# Patient Record
Sex: MALE | Race: WHITE | Employment: UNEMPLOYED | URBAN - METROPOLITAN AREA
[De-identification: names, ages, dates, MRNs, and addresses within clinical notes are randomized per-mention and may not be internally consistent; named-entity substitution may affect disease eponyms.]

---

## 2018-05-17 ENCOUNTER — HOSPITAL ENCOUNTER (OUTPATIENT)
Dept: LAB | Age: 53
Discharge: HOME | End: 2018-05-17

## 2018-05-17 PROCEDURE — 99001 SPECIMEN HANDLING PT-LAB: CPT | Performed by: INTERNAL MEDICINE

## 2019-02-06 ENCOUNTER — HOSPITAL ENCOUNTER (OUTPATIENT)
Dept: LAB | Age: 54
Discharge: HOME | End: 2019-02-06

## 2019-02-06 PROCEDURE — 99001 SPECIMEN HANDLING PT-LAB: CPT

## 2019-08-21 ENCOUNTER — OFFICE VISIT (OUTPATIENT)
Dept: CARDIOLOGY CLINIC | Age: 54
End: 2019-08-21

## 2019-08-21 VITALS
HEART RATE: 75 BPM | HEIGHT: 69 IN | BODY MASS INDEX: 16 KG/M2 | DIASTOLIC BLOOD PRESSURE: 94 MMHG | SYSTOLIC BLOOD PRESSURE: 157 MMHG | WEIGHT: 108 LBS

## 2019-08-21 DIAGNOSIS — R01.1 HEART MURMUR: ICD-10-CM

## 2019-08-21 DIAGNOSIS — J44.9 CHRONIC OBSTRUCTIVE PULMONARY DISEASE, UNSPECIFIED COPD TYPE (HCC): ICD-10-CM

## 2019-08-21 DIAGNOSIS — E78.5 HYPERLIPIDEMIA, UNSPECIFIED HYPERLIPIDEMIA TYPE: ICD-10-CM

## 2019-08-21 DIAGNOSIS — Z86.73 OLD CEREBROVASCULAR ACCIDENT (CVA) WITHOUT LATE EFFECT: ICD-10-CM

## 2019-08-21 DIAGNOSIS — R07.9 CHEST PAIN, UNSPECIFIED TYPE: Primary | ICD-10-CM

## 2019-08-21 DIAGNOSIS — I10 ESSENTIAL HYPERTENSION: ICD-10-CM

## 2019-08-21 RX ORDER — OMEPRAZOLE 20 MG/1
20 CAPSULE, DELAYED RELEASE ORAL DAILY
COMMUNITY
End: 2019-08-21

## 2019-08-21 RX ORDER — NITROGLYCERIN 400 UG/1
1 SPRAY ORAL
Qty: 1 BOTTLE | Refills: 1 | Status: SHIPPED | OUTPATIENT
Start: 2019-08-21 | End: 2021-07-22

## 2019-08-21 NOTE — PROGRESS NOTES
HISTORY OF PRESENT ILLNESS  Edwin Hartman is a 48 y.o. male. New Patient   The history is provided by the patient. Associated symptoms include shortness of breath. Pertinent negatives include no chest pain, no abdominal pain and no headaches. Chest Pain (Angina)    The history is provided by the patient. This is a new problem. The current episode started more than 1 week ago. The problem has not changed since onset. The problem occurs every several days. The pain is associated with rest. The pain is present in the left side and substernal region. The pain is mild. The quality of the pain is described as sharp. The pain does not radiate. Associated symptoms include shortness of breath. Pertinent negatives include no abdominal pain, no claudication, no cough, no dizziness, no fever, no headaches, no hemoptysis, no nausea, no orthopnea, no palpitations, no PND, no sputum production, no vomiting and no weakness. Shortness of Breath   The history is provided by the patient. This is a recurrent problem. The problem occurs frequently. The current episode started more than 1 week ago. The problem has not changed since onset. Pertinent negatives include no fever, no headaches, no cough, no sputum production, no hemoptysis, no wheezing, no PND, no orthopnea, no chest pain, no vomiting, no abdominal pain, no rash, no leg swelling and no claudication. Precipitated by: exertion. Review of Systems   Constitutional: Negative for chills and fever. HENT: Negative for nosebleeds. Eyes: Negative for blurred vision and double vision. Respiratory: Positive for shortness of breath. Negative for cough, hemoptysis, sputum production and wheezing. Cardiovascular: Negative for chest pain, palpitations, orthopnea, claudication, leg swelling and PND. Gastrointestinal: Negative for abdominal pain, heartburn, nausea and vomiting. Musculoskeletal: Negative for myalgias. Skin: Negative for rash.    Neurological: Negative for dizziness, weakness and headaches. Endo/Heme/Allergies: Does not bruise/bleed easily. Family History   Problem Relation Age of Onset    Heart Disease Mother         Ischemic  heart disease    Heart Attack Father        Past Medical History:   Diagnosis Date    CAD (coronary artery disease)     Chest pain, unspecified     Chronic airway obstruction, not elsewhere classified     Chronic lung disease     COPD     Coronary atherosclerosis of native coronary artery     Noncritical left main disease    Essential hypertension, benign     Fatigue     Hepatitis B     History of hepatitis B    Hypercholesteremia     Hypertension     Intermediate coronary syndrome ([facility] [address]     Other and unspecified hyperlipidemia     Pneumothorax 2005    Psychiatric disorder     depression    Rectal bleeding     [facility]    TIA - weakness - generalized    Weight loss        Past Surgical History:   Procedure Laterality Date    HX OTHER SURGICAL      Lung collapse       Social History     Tobacco Use    Smoking status: Current Every Day Smoker     Packs/day: 0.50     Years: 30.00     Pack years: 15.00     Types: Cigarettes    Smokeless tobacco: Never Used   Substance Use Topics    Alcohol use: Not Currently       No Known Allergies    Prior to Admission medications    Medication Sig Start Date End Date Taking? Authorizing Provider   tiotropium-olodaterol (Marcina Myron) 2.5-2.5 mcg/actuation inhaler Take  by inhalation. Yes Provider, Historical   ipratropium-albuterol (COMBIVENT RESPIMAT)  mcg/actuation inhaler Take 1 Puff by inhalation every six (6) hours. Yes Provider, Historical   nitroglycerin (NITROLINGUAL) 400 mcg/spray spray 1 Spray by SubLINGual route every five (5) minutes as needed for Chest Pain. 8/21/19  Yes Tayo Hogan MD   metoprolol (LOPRESSOR) 25 mg tablet Take 25 mg by mouth daily.    Yes Provider, Historical   simvastatin (ZOCOR) 20 mg tablet Take 20 mg by mouth nightly. Yes Provider, Historical   aspirin 81 mg tablet Take 81 mg by mouth daily. Yes Provider, Historical   hydrocodone-acetaminophen (NORCO) 5-325 mg per tablet Take 1-2 tablets PO every 4-6 hours as needed for pain control. If over the counter ibuprofen or acetaminophen was suggested, then only take the vicodin for pain not well controlled with the over the counter medication. 9/4/14   Ravindra Sandoval, DO         Visit Vitals  BP (!) 157/94   Pulse 75   Ht 5' 9\" (1.753 m)   Wt 49 kg (108 lb)   BMI 15.95 kg/m²         Physical Exam   Constitutional: He is oriented to person, place, and time. He appears well-developed and well-nourished. HENT:   Head: Normocephalic and atraumatic. Eyes: Conjunctivae are normal.   Neck: Neck supple. No JVD present. No tracheal deviation present. No thyromegaly present. Cardiovascular: Normal rate and regular rhythm. Exam reveals no gallop and no friction rub. Murmur heard. Holosystolic murmur is present with a grade of 3/6 at the lower right sternal border and apex. Pulmonary/Chest: Breath sounds normal. No respiratory distress. He has no wheezes. He has no rales. He exhibits no tenderness. Abdominal: Soft. There is no tenderness. Musculoskeletal: He exhibits no edema. Neurological: He is alert and oriented to person, place, and time. Skin: Skin is warm and dry. Psychiatric: He has a normal mood and affect. Mr. Talha Butler has a reminder for a \"due or due soon\" health maintenance. I have asked that he contact his primary care provider for follow-up on this health maintenance. No flowsheet data found. Assessment         ICD-10-CM ICD-9-CM    1. Chest pain, unspecified type R07.9 786.50 AMB POC EKG ROUTINE W/ 12 LEADS, INTER & REP      ECHO ADULT COMPLETE      NUCLEAR CARDIAC STRESS TEST    Atypical chest pain. Possible angina or chest wall. Abnormal EKG poor artery progression possible old anteroseptal MI.   2. Essential hypertension I10 401.9     Mildly elevated monitor adjust medication as needed   3. Hyperlipidemia, unspecified hyperlipidemia type E78.5 272.4     Continue with statin   4. Chronic obstructive pulmonary disease, unspecified COPD type ([facility] J44.9 496 ECHO ADULT COMPLETE      NUCLEAR CARDIAC STRESS TEST    Continue treatment follow-up with PCP   5. Old cerebrovascular accident (CVA) without late effect Z86.73 V12.54     Monitoring aspirin and statin   6. Heart murmur R01.1 785. 2     Systolic murmur possible mitral regurgitation. Check echo       Medications Discontinued During This Encounter   Medication Reason    omeprazole (PRILOSEC) 20 mg capsule Not A Current Medication    PARoxetine (PAXIL) 20 mg tablet Not A Current Medication    beclomethasone dipropionate 84 mcg/actuation Aero Not A Current Medication    nitroglycerin (NITROLINGUAL) 0.4 mg/dose spray Reorder       Orders Placed This Encounter    AMB POC EKG ROUTINE W/ 12 LEADS, INTER & REP     Order Specific Question:   Reason for Exam:     Answer:   Chest Pain    nitroglycerin (NITROLINGUAL) 400 mcg/spray spray     Si Spray by SubLINGual route every five (5) minutes as needed for Chest Pain. Dispense:  1 Bottle     Refill:  1       Follow-up and Dispositions    · Return for F/u after tests.

## 2019-08-28 ENCOUNTER — OFFICE VISIT (OUTPATIENT)
Dept: CARDIOLOGY CLINIC | Age: 54
End: 2019-08-28

## 2019-08-28 VITALS
HEIGHT: 69 IN | BODY MASS INDEX: 15.91 KG/M2 | SYSTOLIC BLOOD PRESSURE: 144 MMHG | WEIGHT: 107.4 LBS | DIASTOLIC BLOOD PRESSURE: 97 MMHG | HEART RATE: 86 BPM

## 2019-08-28 DIAGNOSIS — I34.0 NON-RHEUMATIC MITRAL REGURGITATION: ICD-10-CM

## 2019-08-28 DIAGNOSIS — I34.1 MVP (MITRAL VALVE PROLAPSE): ICD-10-CM

## 2019-08-28 DIAGNOSIS — J44.9 CHRONIC OBSTRUCTIVE PULMONARY DISEASE, UNSPECIFIED COPD TYPE (HCC): ICD-10-CM

## 2019-08-28 DIAGNOSIS — I10 ESSENTIAL HYPERTENSION: ICD-10-CM

## 2019-08-28 DIAGNOSIS — R07.9 CHEST PAIN, UNSPECIFIED TYPE: Primary | ICD-10-CM

## 2019-08-28 RX ORDER — AMLODIPINE BESYLATE 5 MG/1
5 TABLET ORAL DAILY
Qty: 30 TAB | Refills: 6 | Status: SHIPPED | OUTPATIENT
Start: 2019-08-28 | End: 2020-08-28

## 2019-08-28 NOTE — PROGRESS NOTES
HISTORY OF PRESENT ILLNESS  Lobo Adamson is a 48 y.o. male. Chest Pain (Angina)    The history is provided by the patient. This is a new problem. The current episode started more than 1 week ago. The problem has not changed since onset. The problem occurs every several days. The pain is associated with rest. The pain is present in the left side and substernal region. The pain is mild. The quality of the pain is described as sharp. The pain does not radiate. Associated symptoms include shortness of breath. Pertinent negatives include no claudication, no cough, no dizziness, no fever, no hemoptysis, no nausea, no orthopnea, no palpitations, no PND, no sputum production, no vomiting and no weakness. Shortness of Breath   The history is provided by the patient. This is a recurrent problem. The problem occurs frequently. The current episode started more than 1 week ago. The problem has not changed since onset. Associated symptoms include chest pain. Pertinent negatives include no fever, no cough, no sputum production, no hemoptysis, no wheezing, no PND, no orthopnea, no vomiting, no rash, no leg swelling and no claudication. Precipitated by: exertion. Review of Systems   Constitutional: Negative for chills and fever. HENT: Negative for nosebleeds. Eyes: Negative for blurred vision and double vision. Respiratory: Positive for shortness of breath. Negative for cough, hemoptysis, sputum production and wheezing. Cardiovascular: Positive for chest pain. Negative for palpitations, orthopnea, claudication, leg swelling and PND. Gastrointestinal: Negative for heartburn, nausea and vomiting. Musculoskeletal: Negative for myalgias. Skin: Negative for rash. Neurological: Negative for dizziness and weakness. Endo/Heme/Allergies: Does not bruise/bleed easily.      Family History   Problem Relation Age of Onset    Heart Attack Father     Heart Disease Mother         Ischemic  heart disease Past Medical History:   Diagnosis Date    CAD (coronary artery disease)     Chest pain, unspecified     Chronic airway obstruction, not elsewhere classified     Chronic lung disease     COPD     Coronary atherosclerosis of native coronary artery     Noncritical left main disease    Essential hypertension, benign     Fatigue     Hepatitis B     History of hepatitis B    Hypercholesteremia     Hypertension     Intermediate coronary syndrome (HCC)     Other and unspecified hyperlipidemia     Pneumothorax 2005    Psychiatric disorder     depression    Rectal bleeding     Stroke ([facility] [address] 2005    TIA - weakness - generalized    Weight loss        Past Surgical History:   Procedure Laterality Date    HX OTHER SURGICAL      Lung collapse       Social History     Tobacco Use    Smoking status: Current Every Day Smoker     Packs/day: 0.50     Years: 30.00     Pack years: 15.00     Types: Cigarettes    Smokeless tobacco: Never Used   Substance Use Topics    Alcohol use: Not Currently       No Known Allergies    Prior to Admission medications    Medication Sig Start Date End Date Taking? Authorizing Provider   amLODIPine (NORVASC) 5 mg tablet Take 1 Tab by mouth daily. 8/28/19  Yes oKrey Joyce MD   tiotropium-olodaterol (STIOLTO RESPIMAT) 2.5-2.5 mcg/actuation inhaler Take  by inhalation. Yes Provider, Historical   ipratropium-albuterol (COMBIVENT RESPIMAT)  mcg/actuation inhaler Take 1 Puff by inhalation every six (6) hours. Yes Provider, Historical   nitroglycerin (NITROLINGUAL) 400 mcg/spray spray 1 Spray by SubLINGual route every five (5) minutes as needed for Chest Pain. 8/21/19  Yes Korey Joyce MD   hydrocodone-acetaminophen [facility]) 5-325 mg per tablet Take 1-2 tablets PO every 4-6 hours as needed for pain control. If over the counter ibuprofen or acetaminophen was suggested, then only take the vicodin for pain not well controlled with the over the counter medication.  9/4/14 Yes Hollie Sandoval, DO   metoprolol (LOPRESSOR) 25 mg tablet Take 25 mg by mouth daily. Yes Provider, Historical   simvastatin (ZOCOR) 20 mg tablet Take 20 mg by mouth nightly. Yes Provider, Historical   aspirin 81 mg tablet Take 81 mg by mouth daily. Yes Provider, Historical         Visit Vitals  BP (!) 144/97   Pulse 86   Ht 5' 9\" (1.753 m)   Wt 48.7 kg (107 lb 6.4 oz)   BMI 15.86 kg/m²         Physical Exam   Constitutional: He is oriented to person, place, and time. He appears well-developed and well-nourished. HENT:   Head: Normocephalic and atraumatic. Eyes: Conjunctivae are normal.   Neck: Neck supple. No JVD present. No tracheal deviation present. No thyromegaly present. Cardiovascular: Normal rate and regular rhythm. Exam reveals no gallop and no friction rub. Murmur heard. Holosystolic murmur is present with a grade of 3/6 at the lower right sternal border and apex. Pulmonary/Chest: Breath sounds normal. No respiratory distress. He has no wheezes. He has no rales. He exhibits no tenderness. Abdominal: Soft. There is no tenderness. Musculoskeletal: He exhibits no edema. Neurological: He is alert and oriented to person, place, and time. Skin: Skin is warm and dry. Psychiatric: He has a normal mood and affect. Mr. Enmanuel Altamirano has a reminder for a \"due or due soon\" health maintenance. I have asked that he contact his primary care provider for follow-up on this health maintenance. No flowsheet data found. Interpretation Summary 8/2019    · Left Ventricle: Normal cavity size, wall thickness and systolic function (ejection fraction normal). Estimated left ventricular ejection fraction is 56 - 60%. No regional wall motion abnormality noted. Moderate (grade 2) left ventricular diastolic dysfunction. · Right Ventricle: Normal right ventricular size and function. · Mitral Valve: Mitral valve prolapse of the anterior leaflet. Moderate mitral valve regurgitation.   · Tricuspid Valve: Mild tricuspid valve regurgitation is present. · Pulmonary Artery: There is no evidence of pulmonary hypertension. · Pulmonic Valve: Mild pulmonic valve regurgitation is present. · Aorta: Mild aortic root dilatation. Procedure Conclusion 8/2019    Nuclear Stress Test     Negative myocardial perfusion imaging. Myocardial perfusion imaging supports a low risk stress test.   There is a prior study available for comparison. Assessment         ICD-10-CM ICD-9-CM    1. Chest pain, unspecified type R07.9 786.50     Atypical chest pain. Negative stress test continue monitoring   2. Chronic obstructive pulmonary disease, unspecified COPD type ([facility] J44.9 496     Stable on treatment   3. MVP (mitral valve prolapse) I34.1 424.0     Prolapse continue monitoring   4. Non-rheumatic mitral regurgitation I34.0 424.0     Moderate mitral regurgitation related to prolapse. Monitor. 5. Essential hypertension I10 401.9     Mildly elevated. .  Add amlodipine   8/2019   mitral valve prolapse with moderate mitral regurgitation. Mildly elevated blood pressure. Add amlodipine continue to monitor. There are no discontinued medications. Orders Placed This Encounter    amLODIPine (NORVASC) 5 mg tablet     Sig: Take 1 Tab by mouth daily. Dispense:  30 Tab     Refill:  6       Follow-up and Dispositions    · Return in about 6 months (around 2/28/2020).

## 2019-08-28 NOTE — PROGRESS NOTES
1. Have you been to the ER, urgent care clinic since your last visit? Hospitalized since your last visit? No    2. Have you seen or consulted any other health care providers outside of the [address] [place] [address] since your last visit? Include any pap smears or colon screening.  No

## 2019-10-23 ENCOUNTER — HOSPITAL ENCOUNTER (OUTPATIENT)
Dept: LAB | Age: 54
Discharge: HOME | End: 2019-10-23
Payer: MEDICAID

## 2019-10-23 LAB
ALBUMIN SERPL-MCNC: 3.9 G/DL (ref 3.4–5)
ALBUMIN/GLOB SERPL: 1.4 {RATIO} (ref 0.8–1.7)
ALP SERPL-CCNC: 78 U/L (ref 45–117)
ALT SERPL-CCNC: 16 U/L (ref 16–61)
ANION GAP SERPL CALC-SCNC: 5 MMOL/L (ref 3–18)
AST SERPL-CCNC: 12 U/L (ref 10–38)
BASOPHILS # BLD: 0 K/UL (ref 0–0.1)
BASOPHILS NFR BLD: 1 % (ref 0–2)
BILIRUB SERPL-MCNC: 0.4 MG/DL (ref 0.2–1)
BUN SERPL-MCNC: 11 MG/DL (ref 7–18)
BUN/CREAT SERPL: 12 (ref 12–20)
CALCIUM SERPL-MCNC: 8.8 MG/DL (ref 8.5–10.1)
CHLORIDE SERPL-SCNC: 107 MMOL/L (ref 100–111)
CHOLEST SERPL-MCNC: 171 MG/DL
CO2 SERPL-SCNC: 30 MMOL/L (ref 21–32)
CREAT SERPL-MCNC: 0.9 MG/DL (ref 0.6–1.3)
DIFFERENTIAL METHOD BLD: ABNORMAL
EOSINOPHIL # BLD: 0 K/UL (ref 0–0.4)
EOSINOPHIL NFR BLD: 1 % (ref 0–5)
ERYTHROCYTE [DISTWIDTH] IN BLOOD BY AUTOMATED COUNT: 13.3 % (ref 11.6–14.5)
GLOBULIN SER CALC-MCNC: 2.7 G/DL (ref 2–4)
GLUCOSE SERPL-MCNC: 80 MG/DL (ref 74–99)
HCT VFR BLD AUTO: 42.4 % (ref 36–48)
HDLC SERPL-MCNC: 56 MG/DL (ref 40–60)
HDLC SERPL: 3.1 {RATIO} (ref 0–5)
HGB BLD-MCNC: 14 G/DL (ref 13–16)
LDLC SERPL CALC-MCNC: 96.8 MG/DL (ref 0–100)
LIPID PROFILE,FLP: NORMAL
LYMPHOCYTES # BLD: 2.1 K/UL (ref 0.9–3.6)
LYMPHOCYTES NFR BLD: 30 % (ref 21–52)
MCH RBC QN AUTO: 30.8 PG (ref 24–34)
MCHC RBC AUTO-ENTMCNC: 33 G/DL (ref 31–37)
MCV RBC AUTO: 93.4 FL (ref 74–97)
MONOCYTES # BLD: 0.5 K/UL (ref 0.05–1.2)
MONOCYTES NFR BLD: 7 % (ref 3–10)
NEUTS SEG # BLD: 4.2 K/UL (ref 1.8–8)
NEUTS SEG NFR BLD: 61 % (ref 40–73)
PLATELET # BLD AUTO: 210 K/UL (ref 135–420)
PMV BLD AUTO: 10.6 FL (ref 9.2–11.8)
POTASSIUM SERPL-SCNC: 4.2 MMOL/L (ref 3.5–5.5)
PROT SERPL-MCNC: 6.6 G/DL (ref 6.4–8.2)
RBC # BLD AUTO: 4.54 M/UL (ref 4.7–5.5)
SODIUM SERPL-SCNC: 142 MMOL/L (ref 136–145)
TRIGL SERPL-MCNC: 91 MG/DL (ref ?–150)
VLDLC SERPL CALC-MCNC: 18.2 MG/DL
WBC # BLD AUTO: 6.9 K/UL (ref 4.6–13.2)

## 2019-10-23 PROCEDURE — 84436 ASSAY OF TOTAL THYROXINE: CPT

## 2019-10-23 PROCEDURE — 85025 COMPLETE CBC W/AUTO DIFF WBC: CPT

## 2019-10-23 PROCEDURE — 80053 COMPREHEN METABOLIC PANEL: CPT

## 2019-10-23 PROCEDURE — 80061 LIPID PANEL: CPT

## 2019-10-23 PROCEDURE — 36415 COLL VENOUS BLD VENIPUNCTURE: CPT

## 2019-10-24 LAB
T4 SERPL-MCNC: 8.1 UG/DL (ref 4.5–12.1)
TSH SERPL DL<=0.05 MIU/L-ACNC: 1.77 UIU/ML (ref 0.36–3.74)

## 2020-02-19 ENCOUNTER — OFFICE VISIT (OUTPATIENT)
Dept: CARDIOLOGY CLINIC | Age: 55
End: 2020-02-19

## 2020-02-19 VITALS
TEMPERATURE: 97 F | WEIGHT: 107 LBS | SYSTOLIC BLOOD PRESSURE: 104 MMHG | DIASTOLIC BLOOD PRESSURE: 66 MMHG | HEART RATE: 79 BPM | BODY MASS INDEX: 15.85 KG/M2 | HEIGHT: 69 IN | OXYGEN SATURATION: 98 %

## 2020-02-19 DIAGNOSIS — I34.0 NON-RHEUMATIC MITRAL REGURGITATION: Primary | ICD-10-CM

## 2020-02-19 DIAGNOSIS — Z86.73 OLD CEREBROVASCULAR ACCIDENT (CVA) WITHOUT LATE EFFECT: ICD-10-CM

## 2020-02-19 DIAGNOSIS — I34.1 MVP (MITRAL VALVE PROLAPSE): ICD-10-CM

## 2020-02-19 DIAGNOSIS — J44.9 CHRONIC OBSTRUCTIVE PULMONARY DISEASE, UNSPECIFIED COPD TYPE (HCC): ICD-10-CM

## 2020-02-19 DIAGNOSIS — I10 ESSENTIAL HYPERTENSION: ICD-10-CM

## 2020-02-19 DIAGNOSIS — E78.5 HYPERLIPIDEMIA, UNSPECIFIED HYPERLIPIDEMIA TYPE: ICD-10-CM

## 2020-02-19 RX ORDER — CYCLOBENZAPRINE HCL 10 MG
10 TABLET ORAL 2 TIMES DAILY
COMMUNITY

## 2020-02-19 RX ORDER — IBUPROFEN 800 MG/1
800 TABLET ORAL
COMMUNITY

## 2020-02-19 RX ORDER — OMEPRAZOLE 20 MG/1
20 CAPSULE, DELAYED RELEASE ORAL DAILY
COMMUNITY
End: 2021-03-03

## 2020-02-19 NOTE — PROGRESS NOTES
1. Have you been to the ER, urgent care clinic since your last visit? Hospitalized since your last visit? No    2. Have you seen or consulted any other health care providers outside of the [address] [place] [address] since your last visit? Include any pap smears or colon screening.  No

## 2020-02-19 NOTE — PROGRESS NOTES
HISTORY OF PRESENT ILLNESS  Tere Hernandez is a 47 y.o. male. Chest Pain (Angina)    The history is provided by the patient. This is a new problem. The current episode started more than 1 week ago. The problem has not changed since onset. The problem occurs every several days. The pain is associated with rest. The pain is present in the left side and substernal region. The pain is mild. The quality of the pain is described as sharp. The pain does not radiate. Associated symptoms include shortness of breath. Pertinent negatives include no claudication, no cough, no dizziness, no fever, no hemoptysis, no nausea, no orthopnea, no palpitations, no PND, no sputum production, no vomiting and no weakness. Shortness of Breath   The history is provided by the patient. This is a recurrent problem. The problem occurs frequently. The current episode started more than 1 week ago. The problem has not changed since onset. Associated symptoms include chest pain. Pertinent negatives include no fever, no cough, no sputum production, no hemoptysis, no wheezing, no PND, no orthopnea, no vomiting, no rash, no leg swelling and no claudication. Precipitated by: exertion. Review of Systems   Constitutional: Negative for chills and fever. HENT: Negative for nosebleeds. Eyes: Negative for blurred vision and double vision. Respiratory: Positive for shortness of breath. Negative for cough, hemoptysis, sputum production and wheezing. Cardiovascular: Positive for chest pain. Negative for palpitations, orthopnea, claudication, leg swelling and PND. Gastrointestinal: Negative for heartburn, nausea and vomiting. Musculoskeletal: Negative for myalgias. Skin: Negative for rash. Neurological: Negative for dizziness and weakness. Endo/Heme/Allergies: Does not bruise/bleed easily.      Family History   Problem Relation Age of Onset    Heart Attack Father     Heart Disease Mother         Ischemic  heart disease Past Medical History:   Diagnosis Date    CAD (coronary artery disease)     Chest pain, unspecified     Chronic airway obstruction, not elsewhere classified     Chronic lung disease     COPD     Coronary atherosclerosis of native coronary artery     Noncritical left main disease    Essential hypertension, benign     Fatigue     Hepatitis B     History of hepatitis B    Hypercholesteremia     Hypertension     Intermediate coronary syndrome (HCC)     Other and unspecified hyperlipidemia     Pneumothorax 2005    Psychiatric disorder     depression    Rectal bleeding     Stroke ([facility] [address] 2005    TIA - weakness - generalized    Weight loss        Past Surgical History:   Procedure Laterality Date    HX OTHER SURGICAL      Lung collapse       Social History     Tobacco Use    Smoking status: Current Every Day Smoker     Packs/day: 0.50     Years: 30.00     Pack years: 15.00     Types: Cigarettes    Smokeless tobacco: Never Used   Substance Use Topics    Alcohol use: Not Currently       No Known Allergies    Prior to Admission medications    Medication Sig Start Date End Date Taking? Authorizing Provider   ibuprofen (MOTRIN) 800 mg tablet Take 800 mg by mouth every eight (8) hours as needed for Pain. Yes Provider, Historical   cyclobenzaprine (FLEXERIL) 10 mg tablet Take 10 mg by mouth two (2) times a day. Yes Provider, Historical   omeprazole (PRILOSEC) 20 mg capsule Take 20 mg by mouth daily. Yes Provider, Historical   amLODIPine (NORVASC) 5 mg tablet Take 1 Tab by mouth daily. 8/28/19  Yes Nasra Manning MD   tiotropium-olodaterol (STIOLTO RESPIMAT) 2.5-2.5 mcg/actuation inhaler Take  by inhalation. Yes Provider, Historical   ipratropium-albuterol (COMBIVENT RESPIMAT)  mcg/actuation inhaler Take 1 Puff by inhalation every six (6) hours.    Yes Provider, Historical   nitroglycerin (NITROLINGUAL) 400 mcg/spray spray 1 Spray by SubLINGual route every five (5) minutes as needed for Chest Pain. 8/21/19  Yes Aleksandar Quiles MD   metoprolol (LOPRESSOR) 25 mg tablet Take 25 mg by mouth daily. Yes Provider, Historical   simvastatin (ZOCOR) 20 mg tablet Take 20 mg by mouth nightly. Yes Provider, Historical   aspirin 81 mg tablet Take 81 mg by mouth daily. Yes Provider, Historical   hydrocodone-acetaminophen (NORCO) 5-325 mg per tablet Take 1-2 tablets PO every 4-6 hours as needed for pain control. If over the counter ibuprofen or acetaminophen was suggested, then only take the vicodin for pain not well controlled with the over the counter medication. 9/4/14   Abby Sandovla DO         Visit Vitals  /66 (BP 1 Location: Left arm, BP Patient Position: Sitting)   Pulse 79   Temp 97 °F (36.1 °C) (Oral)   Ht 5' 9\" (1.753 m)   Wt 48.5 kg (107 lb)   SpO2 98%   BMI 15.80 kg/m²         Physical Exam   Constitutional: He is oriented to person, place, and time. He appears well-developed and well-nourished. HENT:   Head: Normocephalic and atraumatic. Eyes: Conjunctivae are normal.   Neck: Neck supple. No JVD present. No tracheal deviation present. No thyromegaly present. Cardiovascular: Normal rate and regular rhythm. Exam reveals no gallop and no friction rub. Murmur heard. Holosystolic murmur is present with a grade of 3/6 at the lower right sternal border and apex. Pulmonary/Chest: Breath sounds normal. No respiratory distress. He has no wheezes. He has no rales. He exhibits no tenderness. Abdominal: Soft. There is no abdominal tenderness. Musculoskeletal:         General: No edema. Neurological: He is alert and oriented to person, place, and time. Skin: Skin is warm and dry. Psychiatric: He has a normal mood and affect. Mr. Elan Bryant has a reminder for a \"due or due soon\" health maintenance. I have asked that he contact his primary care provider for follow-up on this health maintenance. No flowsheet data found.      Interpretation Summary 8/2019    · Left Ventricle: Normal cavity size, wall thickness and systolic function (ejection fraction normal). Estimated left ventricular ejection fraction is 56 - 60%. No regional wall motion abnormality noted. Moderate (grade 2) left ventricular diastolic dysfunction. · Right Ventricle: Normal right ventricular size and function. · Mitral Valve: Mitral valve prolapse of the anterior leaflet. Moderate mitral valve regurgitation. · Tricuspid Valve: Mild tricuspid valve regurgitation is present. · Pulmonary Artery: There is no evidence of pulmonary hypertension. · Pulmonic Valve: Mild pulmonic valve regurgitation is present. · Aorta: Mild aortic root dilatation. Procedure Conclusion 8/2019    Nuclear Stress Test     Negative myocardial perfusion imaging. Myocardial perfusion imaging supports a low risk stress test.   There is a prior study available for comparison. Assessment         ICD-10-CM ICD-9-CM    1. Non-rheumatic mitral regurgitation I34.0 424.0     Stable monitor. Short of breath multifactorial mostly from COPD   2. Chronic obstructive pulmonary disease, unspecified COPD type ([facility] J44.9 496 REFERRAL TO PULMONARY DISEASE    Stable continue treatment. Follow-up with PCP   3. MVP (mitral valve prolapse) I34.1 424.0     Stable monitor   4. Essential hypertension I10 401.9     Stable continue treatment   5. Hyperlipidemia, unspecified hyperlipidemia type E78.5 272.4     Stable monitor   6. Old cerebrovascular accident (CVA) without late effect Z86.73 V12.54     Stable continue treatment   8/2019   mitral valve prolapse with moderate mitral regurgitation. Mildly elevated blood pressure. Add amlodipine continue to monitor. There are no discontinued medications.     Orders Placed This Encounter    REFERRAL TO PULMONARY DISEASE     Referral Priority:   Routine     Referral Type:   Consultation     Referral Reason:   Specialty Services Required     Referred to Provider:   Bronwyn Gottron, MD Follow-up and Dispositions    · Return in about 6 months (around 8/19/2020).

## 2020-04-14 ENCOUNTER — OFFICE VISIT (OUTPATIENT)
Dept: PULMONOLOGY | Age: 55
End: 2020-04-14

## 2020-04-14 VITALS
OXYGEN SATURATION: 97 % | HEART RATE: 93 BPM | BODY MASS INDEX: 16 KG/M2 | DIASTOLIC BLOOD PRESSURE: 86 MMHG | HEIGHT: 69 IN | WEIGHT: 108 LBS | TEMPERATURE: 97.7 F | SYSTOLIC BLOOD PRESSURE: 141 MMHG | RESPIRATION RATE: 18 BRPM

## 2020-04-14 DIAGNOSIS — I34.0 NONRHEUMATIC MITRAL VALVE REGURGITATION: ICD-10-CM

## 2020-04-14 DIAGNOSIS — R06.02 SOB (SHORTNESS OF BREATH) ON EXERTION: Primary | ICD-10-CM

## 2020-04-14 DIAGNOSIS — R05.3 CHRONIC COUGH: ICD-10-CM

## 2020-04-14 DIAGNOSIS — R63.4 WEIGHT LOSS: ICD-10-CM

## 2020-04-14 RX ORDER — FLUTICASONE FUROATE, UMECLIDINIUM BROMIDE AND VILANTEROL TRIFENATATE 100; 62.5; 25 UG/1; UG/1; UG/1
1 POWDER RESPIRATORY (INHALATION) DAILY
Qty: 2 INHALER | Refills: 0 | Status: SHIPPED | COMMUNITY
Start: 2020-04-14 | End: 2020-09-02 | Stop reason: SDUPTHER

## 2020-04-14 NOTE — PATIENT INSTRUCTIONS
COPD and Asthma: Care Instructions Your Care Instructions Some people who have chronic obstructive pulmonary disease (COPD) also have asthma. Both of these problems can damage your lungs. This makes it very important to control them. Asthma causes the airways that lead to the lungs to swell and become narrow. This makes it hard to breathe. You may wheeze or cough. If you have a bad attack, you may need emergency care. There are two parts to treating asthma. · Controlling asthma over the long term. · Treating attacks when they occur. You and your doctor can make an asthma treatment plan that will help. This plan tells you the medicines you take every day to reduce the swelling in your airways and prevent attacks. It also tells you what to do if you have an asthma attack. Follow-up care is a key part of your treatment and safety. Be sure to make and go to all appointments, and call your doctor if you are having problems. It's also a good idea to know your test results and keep a list of the medicines you take. How can you care for yourself at home? To control asthma over the long term Medicines Controller medicines reduce swelling in your lungs. They also prevent asthma attacks. Take your controller medicine exactly as prescribed. Talk to your doctor if you have any problems with your medicine. · Inhaled corticosteroid is a common and effective controller medicine. Using it the right way can prevent or reduce most side effects. · Take your controller medicine every day, not just when you have symptoms. This helps prevent problems before they occur. · Always bring your asthma medicine with you when you travel. · Your doctor may prescribe long-acting medicine that combines a corticosteroid with a beta2-agonist. Follow your doctor's instructions exactly about how to take a long-acting medicine. Examples include: ? Fluticasone and salmeterol (Advair). ? Budesonide and formoterol (Symbicort). · Do not depend on your controller medicines to stop an asthma attack that has already started. They do not work fast enough to help. · Your doctor may also prescribe anticholinergic inhalers. These include ipratropium (Atrovent) and tiotropium (Spiriva). Education · Learn what sets off an asthma attack. Avoid these triggers when you can. Common triggers include smoke, air pollution, pollen, animal dander, colds, stress, and cold air. · Do not smoke. Smoking can make COPD and asthma worse. If you need help quitting, talk to your doctor about stop-smoking programs and medicines. These can increase your chances of quitting for good. · Check yourself for symptoms to know which step to follow in your action plan. Watch for things like being short of breath, having chest tightness, coughing, and wheezing. Also notice if symptoms wake you up at night or if you get tired quickly when you exercise. · You may want to learn how to use a peak flow meter. This measures how open your airways are. It may help you know when you will have an asthma attack. To treat attacks when they occur Use your asthma action plan when you have an attack. Your quick-relief medicine, such as albuterol, will stop an asthma attack. It relaxes the muscles that get tight around the airways. · Take your quick-relief medicine exactly as prescribed. Talk with your doctor if you have any problems with your medicine. · Keep this medicine with you at all times. · You may need to use this medicine before you exercise. If your doctor prescribed corticosteroid pills to use during an attack, take them as directed. They may take hours to work, but they may shorten the attack and help you breathe better. When should you call for help? Call 911 anytime you think you may need emergency care. For example, call if: 
  · You have severe trouble breathing.  
 Call your doctor now or seek immediate medical care if:   · You have new or worse shortness of breath.  
  · You are coughing more deeply or more often, especially if you notice more mucus or a change in the color of your mucus.  
  · You cough up blood.  
  · You have new or increased swelling in your legs or belly.  
  · You have a fever.  
  · You have used your quick-relief medicine but you are still short of breath.  
 Watch closely for changes in your health, and be sure to contact your doctor if you have any problems. Where can you learn more? Go to http://elizabeth-emiliana.info/ Enter A350 in the search box to learn more about \"COPD and Asthma: Care Instructions. \" Current as of: June 9, 2019Content Version: 12.4 © 3574-8580 Healthwise, Incorporated. Care instructions adapted under license by [employer] (which disclaims liability or warranty for this information). If you have questions about a medical condition or this instruction, always ask your healthcare professional. [place] [address] any warranty or liability for your use of this information. Stopping Smoking: Care Instructions Your Care Instructions Cigarette smokers crave the nicotine in cigarettes. Giving it up is much harder than simply changing a habit. Your body has to stop craving the nicotine. It is hard to quit, but you can do it. There are many tools that people use to quit smoking. You may find that combining tools works best for you. There are several steps to quitting. First you get ready to quit. Then you get support to help you. After that, you learn new skills and behaviors to become a nonsmoker. For many people, a necessary step is getting and using medicine. Your doctor will help you set up the plan that best meets your needs. You may want to attend a smoking cessation program to help you quit smoking. When you choose a program, look for one that has proven success.  Ask your doctor for ideas. You will greatly increase your chances of success if you take medicine as well as get counseling or join a cessation program. 
Some of the changes you feel when you first quit tobacco are uncomfortable. Your body will miss the nicotine at first, and you may feel short-tempered and grumpy. You may have trouble sleeping or concentrating. Medicine can help you deal with these symptoms. You may struggle with changing your smoking habits and rituals. The last step is the tricky one: Be prepared for the smoking urge to continue for a time. This is a lot to deal with, but keep at it. You will feel better. Follow-up care is a key part of your treatment and safety. Be sure to make and go to all appointments, and call your doctor if you are having problems. It's also a good idea to know your test results and keep a list of the medicines you take. How can you care for yourself at home? · Ask your family, friends, and coworkers for support. You have a better chance of quitting if you have help and support. · Join a support group, such as Nicotine Anonymous, for people who are trying to quit smoking. · Consider signing up for a smoking cessation program, such as the American Lung Association's Freedom from Smoking program. 
· Get text messaging support. Go to the website at www.smokefree. gov to sign up for the [facility] program. 
· Set a quit date. Pick your date carefully so that it is not right in the middle of a big deadline or stressful time. Once you quit, do not even take a puff. Get rid of all ashtrays and lighters after your last cigarette. Clean your house and your clothes so that they do not smell of smoke. · Learn how to be a nonsmoker. Think about ways you can avoid those things that make you reach for a cigarette. ? Avoid situations that put you at greatest risk for smoking. For some people, it is hard to have a drink with friends without smoking.  For others, they might skip a coffee break with coworkers who smoke. ? Change your daily routine. Take a different route to work or eat a meal in a different place. · Cut down on stress. Calm yourself or release tension by doing an activity you enjoy, such as reading a book, taking a hot bath, or gardening. · Talk to your doctor or pharmacist about nicotine replacement therapy, which replaces the nicotine in your body. You still get nicotine but you do not use tobacco. Nicotine replacement products help you slowly reduce the amount of nicotine you need. These products come in several forms, many of them available over-the-counter: ? Nicotine patches ? Nicotine gum and lozenges ? Nicotine inhaler · Ask your doctor about bupropion (Wellbutrin) or varenicline (Chantix), which are prescription medicines. They do not contain nicotine. They help you by reducing withdrawal symptoms, such as stress and anxiety. · Some people find hypnosis, acupuncture, and massage helpful for ending the smoking habit. · Eat a healthy diet and get regular exercise. Having healthy habits will help your body move past its craving for nicotine. · Be prepared to keep trying. Most people are not successful the first few times they try to quit. Do not get mad at yourself if you smoke again. Make a list of things you learned and think about when you want to try again, such as next week, next month, or next year. Where can you learn more? Go to http://elizabeth-emiilana.info/ Enter L325 in the search box to learn more about \"Stopping Smoking: Care Instructions. \" Current as of: July 4, 2019Content Version: 12.4 © 3809-9258 Healthwise, Incorporated. Care instructions adapted under license by [employer] (which disclaims liability or warranty for this information).  If you have questions about a medical condition or this instruction, always ask your healthcare professional. [address] any warranty or liability for your use of this information.

## 2020-04-14 NOTE — PROGRESS NOTES
Edsel Kocher. presents today for   Chief Complaint   Patient presents with    Cough       Is someone accompanying this pt? No    Is the patient using any DME equipment during OV? No    -DME Company None    Depression Screening:  3 most recent PHQ Screens 8/21/2019   Little interest or pleasure in doing things Not at all   Feeling down, depressed, irritable, or hopeless Not at all   Total Score PHQ 2 0       Learning Assessment:  No flowsheet data found. Abuse Screening:  No flowsheet data found. Fall Risk  No flowsheet data found. Coordination of Care:  1. Have you been to the ER, urgent care clinic since your last visit? Hospitalized since your last visit? No    2. Have you seen or consulted any other health care providers outside of the [address] [place] [address] since your last visit? Include any pap smears or colon screening.  No

## 2020-04-14 NOTE — PROGRESS NOTES
[address] [place] [address] [facility]  Pulmonary, Critical Care, and Sleep Medicine    Initial Patient Consult    Name: Elida Bedoya. MRN: [id number]   : 1965 Hospital:    Date: 2020        IMPRESSION:   · COPD-emphysema diagnosed in  with last pulmonary function test from . No recent PFTs available but symptomatically has significant dyspnea on exertion and chronic persistent cough-CAT score more than 10. Will need to check PFTs for up to date COPD gold staging and directing therapy accordingly. Currently limited due to corona pandemic. Need to consider alpha-1 antitrypsin deficiency. Did not desaturate on 6-minute walk today  · Nonrheumatic mitral valve regurg  · Weight loss, cachexia  · Essential hypertension        RECOMMENDATIONS:   · Discussed with patient current diagnosis need for further evaluation to include repeat pulmonary function testing, needs imaging-we will start with chest x-ray and consider CT scan if abnormalities noted  · I will make more definitive adjustments in his COPD treatment once I have pulmonary function data available. In the meantime we will switch to Trelegy as a single agent with rescue inhaler  · We will check alpha-1 antitrypsin  · Completed 6-minute walk and discussed with patient  · Complete cessation of cigarette smoking  Smoking cessation  The patient was counseled on the dangers of tobacco use, and was advised to quit. Reviewed strategies to maximize success, including removing cigarettes and smoking materials from environment. Preventive vaccinations  Discussed need for social distancing and precautions during the pandemic  We will follow-up in 3 months at which time will order an complete PFTs. Subjective: This patient has been seen and evaluated at the request of Dr. Bill Ascencio for shortness of breath and COPD. Patient is a 47 y.o. male is referred for evaluation of symptoms of shortness of breath and cough.   Patient states that in 2005 he was diagnosed with COPD and since then has been told by his primary care physician that he has severe COPD. He has been a smoker smoking half to 1 pack of cigarettes per day for past 30 years. He is current every day smoker. He complains of cough which has been present for several years usually productive of some mucus in the morning and for majority of the time dry constant coughing. Some cough at night as well. Complains of shortness of breath on exertion-able to walk up to 50 feet but gets out of breath with climbing 4 steps into his house. He denies any significant orthopnea or paroxysmal nocturnal dyspnea  Denies any swelling of his lower extremities  Denies any chronic fever chills night sweats. 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 His father had COPD. He is currently being treated with Stiolto inhaler and Combivent for rescue  He lives at home with his wife who has COPD and has 2 dogs at home  Denies any hospitalizations for COPD  Denies being on oxygen    I have reviewed all of the available data including the patient's previous history external records and radiological imaging available for review. He is followed by cardiology and was diagnosed with mitral valve regurgitation-on medical therapy  In addition applicable cardiology and other lab data were also reviewed.     Past Medical History:   Diagnosis Date    CAD (coronary artery disease)     Chest pain, unspecified     Chronic airway obstruction, not elsewhere classified     Chronic lung disease     COPD     Coronary atherosclerosis of native coronary artery     Noncritical left main disease    Essential hypertension, benign     Fatigue     Hepatitis B     History of hepatitis B    Hypercholesteremia     Hypertension     Intermediate coronary syndrome ([facility] [address]     Other and unspecified hyperlipidemia     Pneumothorax 2005    Psychiatric disorder     depression    Rectal bleeding     Stroke [facility]) 2005    TIA - weakness - generalized    Weight loss       Past Surgical History:   Procedure Laterality Date    HX OTHER SURGICAL      Lung collapse      Prior to Admission medications    Medication Sig Start Date End Date Taking? Authorizing Provider   ibuprofen (MOTRIN) 800 mg tablet Take 800 mg by mouth every eight (8) hours as needed for Pain. Yes Provider, Historical   cyclobenzaprine (FLEXERIL) 10 mg tablet Take 10 mg by mouth two (2) times a day. Yes Provider, Historical   omeprazole (PRILOSEC) 20 mg capsule Take 20 mg by mouth daily. Yes Provider, Historical   amLODIPine (NORVASC) 5 mg tablet Take 1 Tab by mouth daily. 8/28/19  Yes Chandler Primrose, MD   tiotropium-olodaterol (STIOLTO RESPIMAT) 2.5-2.5 mcg/actuation inhaler Take  by inhalation. Yes Provider, Historical   ipratropium-albuterol (COMBIVENT RESPIMAT)  mcg/actuation inhaler Take 1 Puff by inhalation every six (6) hours. Yes Provider, Historical   metoprolol (LOPRESSOR) 25 mg tablet Take 25 mg by mouth daily. Yes Provider, Historical   simvastatin (ZOCOR) 20 mg tablet Take 20 mg by mouth nightly. Yes Provider, Historical   aspirin 81 mg tablet Take 81 mg by mouth daily. Yes Provider, Historical   nitroglycerin (NITROLINGUAL) 400 mcg/spray spray 1 Spray by SubLINGual route every five (5) minutes as needed for Chest Pain. 8/21/19   Chandler Primrose, MD   hydrocodone-acetaminophen [facility]) 5-325 mg per tablet Take 1-2 tablets PO every 4-6 hours as needed for pain control. If over the counter ibuprofen or acetaminophen was suggested, then only take the vicodin for pain not well controlled with the over the counter medication.  9/4/14   Armida Sandoval, DO     No Known Allergies   Social History     Tobacco Use    Smoking status: Current Every Day Smoker     Packs/day: 0.50     Years: 30.00 Pack years: 15.00     Types: Cigarettes    Smokeless tobacco: Never Used   Substance Use Topics    Alcohol use: Not Currently      Family History   Problem Relation Age of Onset    Heart Attack Father     Heart Disease Mother         Ischemic  heart disease        No current facility-administered medications for this visit. Review of Systems:  A comprehensive review of systems was negative except for that written in the HPI. Objective:   Vital Signs:    Visit Vitals  /86 (BP 1 Location: Left arm, BP Patient Position: Sitting)   Pulse 93   Temp 97.7 °F (36.5 °C) (Oral)   Resp 18   Ht 5' 9\" (1.753 m)   Wt 49 kg (108 lb)   SpO2 97% Comment: RA Rest   BMI 15.95 kg/m²        6-minute walk completed in the clinic today-patient walked a distance of 442 m and dyspnea scale change from 2-4 with mild increase in heart rate from 93 to 114. Oxygen saturation remained between 95 to 97%         Physical Exam   Constitutional: He is oriented to person, place, and time. He appears well-developed and well-nourished. HENT:   Head: Normocephalic and atraumatic. Eyes: Conjunctivae are normal.   Neck: Neck supple. No JVD present. No tracheal deviation present. No thyromegaly present. Cardiovascular: Normal rate and regular rhythm. Exam reveals no gallop and no friction rub. Murmur heard. Holosystolic murmur is present with a grade of 3/6 at the lower right sternal border and apex. Pulmonary/Chest: Breath sounds normal. No respiratory distress. He has no wheezes. He has no rales. He exhibits no tenderness. Abdominal: Soft. There is no abdominal tenderness. Musculoskeletal:         General: No edema. Neurological: He is alert and oriented to person, place, and time. Skin: Skin is warm and dry. Psychiatric: He has a normal mood and affect. Data review:   No results found for this or any previous visit (from the past 24 hour(s)).     Imaging:  I have personally reviewed the patients radiographs and have reviewed the reports:  XR Results (most recent):  Results from Hospital Encounter encounter on 03/19/12   XR CHEST SINGLE VIEW    Narrative Ordering MD: Whitney Agrawal MD  Signed By: Nemo Eugene MD  ** FINAL **  ---------------------------------------------------------------------  Procedure Date:  03/19/2012   Accession Number:  [id number]           Order No:   [id number]         Procedure:   RDV - CHEST  1 VIEW        CPT Code:   66984      Admit Diag:   CP              Reason:   CHEST PAIN  INTERPRETATION:  HISTORY: Chest pain. Exam: Chest.  Technique: Single view upright portable chest. Compared to   09/27/2009 exam.  FINDINGS: No pneumothorax, pneumonia or pleural effusions are seen. Marked bilateral hyperexpansion is redemonstrated. Cardiac   silhouette, mediastinal structures and bony thorax are unchanged. IMPRESSION:  1. Marked bilateral hyperexpansion without change. CT Results (most recent):  No results found for this or any previous visit. PFT:  NAME:  Jennifer De:  OP       DATE:   03/15/2006   #:                                   #:  [id number]   REFERRING PHYSICIAN:    DISABILITY DET. SVS.   COMMENTS:   Examination of the expiratory spirogram reveals a smooth curve and good   effort. The forced expiratory time is greater than six seconds and the   terminal plateau is reached. The test meets ATS criteria for acceptability   and reproducibility. Forced vital capacity and FEV1 are normal.  The FEV1/FVC ratio is reduced   at 67% of predicted. This is compatible with mild airflow obstruction by   GOLD criteria. After the administration of an inhaled bronchodilator, there is no   significant change in measured flow parameters. Diffusion capacity for carbon monoxide is moderately reduced at 47% of   predicted. This is corrected for hemoglobin of 15.0 and a   carboxyhemoglobin level of 1.0.    The flow volume loop is normal.   IMPRESSION:      1. Mild airflow obstruction by GOLD criteria. 2. Moderate reduction of diffusion capacity implying loss of pulmonary         vascular bed and/or parenchyma. 08/23/19   ECHO ADULT COMPLETE 08/23/2019 8/23/2019    Narrative · Left Ventricle: Normal cavity size, wall thickness and systolic function   (ejection fraction normal). Estimated left ventricular ejection fraction   is 56 - 60%. No regional wall motion abnormality noted. Moderate (grade 2)   left ventricular diastolic dysfunction. · Right Ventricle: Normal right ventricular size and function. · Mitral Valve: Mitral valve prolapse of the anterior leaflet. Moderate   mitral valve regurgitation. · Tricuspid Valve: Mild tricuspid valve regurgitation is present. · Pulmonary Artery: There is no evidence of pulmonary hypertension. · Pulmonic Valve: Mild pulmonic valve regurgitation is present. · Aorta: Mild aortic root dilatation. Signed by: Ez Arango MD         Complex decision making was made in the evaluation and management plans during this consultation. More than 50% of time was spent in counseling and coordination of care including review of data and discussion with other team members.          Kendell Guerrier MD

## 2020-04-15 DIAGNOSIS — R06.02 SOB (SHORTNESS OF BREATH) ON EXERTION: Primary | ICD-10-CM

## 2020-04-16 DIAGNOSIS — R06.02 SOB (SHORTNESS OF BREATH) ON EXERTION: ICD-10-CM

## 2020-08-28 RX ORDER — AMLODIPINE BESYLATE 5 MG/1
TABLET ORAL
Qty: 30 TAB | Refills: 6 | Status: SHIPPED | OUTPATIENT
Start: 2020-08-28 | End: 2021-03-12

## 2020-09-02 ENCOUNTER — VIRTUAL VISIT (OUTPATIENT)
Dept: CARDIOLOGY CLINIC | Age: 55
End: 2020-09-02

## 2020-09-02 ENCOUNTER — TELEPHONE (OUTPATIENT)
Dept: CARDIOLOGY CLINIC | Age: 55
End: 2020-09-02

## 2020-09-02 DIAGNOSIS — I34.0 NON-RHEUMATIC MITRAL REGURGITATION: Primary | ICD-10-CM

## 2020-09-02 DIAGNOSIS — I10 ESSENTIAL HYPERTENSION: ICD-10-CM

## 2020-09-02 DIAGNOSIS — E78.5 HYPERLIPIDEMIA, UNSPECIFIED HYPERLIPIDEMIA TYPE: ICD-10-CM

## 2020-09-02 DIAGNOSIS — I34.1 MVP (MITRAL VALVE PROLAPSE): ICD-10-CM

## 2020-09-02 DIAGNOSIS — J44.9 CHRONIC OBSTRUCTIVE PULMONARY DISEASE, UNSPECIFIED COPD TYPE (HCC): ICD-10-CM

## 2020-09-02 RX ORDER — FLUTICASONE FUROATE, UMECLIDINIUM BROMIDE AND VILANTEROL TRIFENATATE 100; 62.5; 25 UG/1; UG/1; UG/1
1 POWDER RESPIRATORY (INHALATION) DAILY
Qty: 2 INHALER | Refills: 1 | Status: SHIPPED | OUTPATIENT
Start: 2020-09-02 | End: 2021-03-03

## 2020-09-02 RX ORDER — TIOTROPIUM BROMIDE AND OLODATEROL 3.124; 2.736 UG/1; UG/1
SPRAY, METERED RESPIRATORY (INHALATION)
COMMUNITY

## 2020-09-02 NOTE — PROGRESS NOTES
Consent: Eva Alvarado, who was seen by synchronous (real-time) audio-video technology, and/or his healthcare decision maker, is aware that this patient-initiated, Telehealth encounter on 9/2/2020 is a billable service, with coverage as determined by his insurance carrier. He is aware that he may receive a bill and has provided verbal consent to proceed: Yes. Subjective:   Eva Alvarado is a 47 y.o. male who was seen for Valvular Heart Disease (6 Month Follow Up)  9/2020  Patient seen today for virtual visit. He is here for follow-up. Patient had mitral valve prolapse mitral regurgitation. Patient shortness of breath on exertion also feels short of breath at night. He has emphysema. Denies any edema denies any chest pain    Family History   Problem Relation Age of Onset    Heart Attack Father     Heart Disease Mother         Ischemic  heart disease     Past Surgical History:   Procedure Laterality Date    HX OTHER SURGICAL      Lung collapse     No Known Allergies    Current Outpatient Medications:     amLODIPine (NORVASC) 5 mg tablet, TAKE 1 TABLET BY MOUTH ONCE DAILY, Disp: 30 Tab, Rfl: 6    fluticasone-umeclidinium-vilanterol (Trelegy Ellipta) 100-62.5-25 mcg inhaler, Take 1 Puff by inhalation daily. , Disp: 2 Inhaler, Rfl: 0    ibuprofen (MOTRIN) 800 mg tablet, Take 800 mg by mouth every eight (8) hours as needed for Pain., Disp: , Rfl:     cyclobenzaprine (FLEXERIL) 10 mg tablet, Take 10 mg by mouth two (2) times a day., Disp: , Rfl:     omeprazole (PRILOSEC) 20 mg capsule, Take 20 mg by mouth daily. , Disp: , Rfl:     ipratropium-albuterol (COMBIVENT RESPIMAT)  mcg/actuation inhaler, Take 1 Puff by inhalation every six (6) hours. , Disp: , Rfl:     nitroglycerin (NITROLINGUAL) 400 mcg/spray spray, 1 [place] by SubLINGual route every five (5) minutes as needed for Chest Pain., Disp: 1 Bottle, Rfl: 1    hydrocodone-acetaminophen (NORCO) 5-325 mg per tablet, Take 1-2 tablets PO every 4-6 hours as needed for pain control. If over the counter ibuprofen or acetaminophen was suggested, then only take the vicodin for pain not well controlled with the over the counter medication. , Disp: 12 tablet, Rfl: 0    metoprolol (LOPRESSOR) 25 mg tablet, Take 25 mg by mouth daily. , Disp: , Rfl:     simvastatin (ZOCOR) 20 mg tablet, Take 20 mg by mouth nightly., Disp: , Rfl:     aspirin 81 mg tablet, Take 81 mg by mouth daily. , Disp: , Rfl:   No Known Allergies      Review of Systems   Review of Systems   Constitutional: Negative for chills and fever. HENT: Negative for nosebleeds. Eyes: Negative for blurred vision and double vision. Respiratory: See HPI   Cardiovascular: See HPI  Gastrointestinal: Negative for abdominal pain, heartburn, nausea and vomiting. Musculoskeletal: Negative for myalgias. Skin: Negative for rash. Neurological: Negative for dizziness, weakness and headaches. Endo/Heme/Allergies: Does not bruise/bleed easily. Objective: There were no vitals taken for this visit. General: alert, cooperative, no distress   Mental  status: normal mood, behavior, speech, dress, motor activity, and thought processes, able to follow commands   HENT: NCAT   Neck: no visualized mass,No JVD   Resp: no respiratory distress   Neuro: no gross deficits   Skin: no discoloration or Bruise on visible areas   Psychiatric: normal affect, consistent with stated mood, no evidence of hallucinations     Additional exam findings:     Extremities:No edema     Pertinent LAB and reports  I have reviewed available  pertinent notes, labs and reports and included in current evaluation and management of this patient. Assessment & Plan:   Diagnoses and all orders for this visit:    1. Non-rheumatic mitral regurgitation  Comments:  Moderate mitral regurgitation. Continue to monitor    2.  Chronic obstructive pulmonary disease, unspecified COPD type ([facility]  Comments:  Stable continue treatment monitor    3. MVP (mitral valve prolapse)  Comments:  Monitor    4. Essential hypertension  Comments:  Continue treatment monitor    5. Hyperlipidemia, unspecified hyperlipidemia type  Comments:  Continue treatment follow-up lab with PCP          9/2020  Shortness of breath stable. Pulmonary wanted to start patient on trilogy I have called in that prescription. He will call pulmonary to decide on which inhaler he will be stopping. Continue other treatment cardiac status stable      712  We discussed the expected course, resolution and complications of the diagnosis(es) in detail. Medication risks, benefits, costs, interactions, and alternatives were discussed as indicated. I advised him to contact the office if his condition worsens, changes or fails to improve as anticipated. He expressed understanding with the diagnosis(es) and plan. Landy Martin is a 47 y.o. male being evaluated by a video visit encounter for concerns as above. A caregiver was present when appropriate. Due to this being a TeleHealth encounter (During [facility] public health emergency), evaluation of the following organ systems was limited: Vitals/Constitutional/EENT/Resp/CV/GI//MS/Neuro/Skin/Heme-Lymph-Imm. Pursuant to the emergency declaration under the [facility] waiver authority and the [employer] Act, this Virtual  Visit was conducted, with patient's (and/or legal guardian's) consent, to reduce the patient's risk of exposure to COVID-19 and provide necessary medical care. Services were provided through a video synchronous discussion virtually to substitute for in-person clinic visit. I was in the office while conducting this encounter.         Ana Isbell MD

## 2020-09-02 NOTE — PROGRESS NOTES
Unable to obtain vital signs. 1. Have you been to the ER, urgent care clinic since your last visit? Hospitalized since your last visit? No    2. Have you seen or consulted any other health care providers outside of the [facility] since your last visit? Include any pap smears or colon screening.  No

## 2021-02-23 ENCOUNTER — HOSPITAL ENCOUNTER (OUTPATIENT)
Dept: LAB | Age: 56
Discharge: HOME | End: 2021-02-23
Payer: MEDICAID

## 2021-02-23 LAB
ALBUMIN SERPL-MCNC: 3.9 G/DL (ref 3.4–5)
ALBUMIN/GLOB SERPL: 1.6 {RATIO} (ref 0.8–1.7)
ALP SERPL-CCNC: 82 U/L (ref 45–117)
ALT SERPL-CCNC: 16 U/L (ref 16–61)
ANION GAP SERPL CALC-SCNC: 5 MMOL/L (ref 3–18)
AST SERPL-CCNC: 15 U/L (ref 10–38)
BASOPHILS # BLD: 0.1 K/UL (ref 0–0.1)
BASOPHILS NFR BLD: 1 % (ref 0–2)
BILIRUB SERPL-MCNC: 0.4 MG/DL (ref 0.2–1)
BUN SERPL-MCNC: 10 MG/DL (ref 7–18)
BUN/CREAT SERPL: 12 (ref 12–20)
CALCIUM SERPL-MCNC: 9 MG/DL (ref 8.5–10.1)
CHLORIDE SERPL-SCNC: 111 MMOL/L (ref 100–111)
CHOLEST SERPL-MCNC: 157 MG/DL
CO2 SERPL-SCNC: 30 MMOL/L (ref 21–32)
CREAT SERPL-MCNC: 0.82 MG/DL (ref 0.6–1.3)
DIFFERENTIAL METHOD BLD: ABNORMAL
EOSINOPHIL # BLD: 0.1 K/UL (ref 0–0.4)
EOSINOPHIL NFR BLD: 2 % (ref 0–5)
ERYTHROCYTE [DISTWIDTH] IN BLOOD BY AUTOMATED COUNT: 13 % (ref 11.6–14.5)
GLOBULIN SER CALC-MCNC: 2.4 G/DL (ref 2–4)
GLUCOSE SERPL-MCNC: 87 MG/DL (ref 74–99)
HCT VFR BLD AUTO: 40.6 % (ref 36–48)
HDLC SERPL-MCNC: 54 MG/DL (ref 40–60)
HDLC SERPL: 2.9 {RATIO} (ref 0–5)
HGB BLD-MCNC: 13.3 G/DL (ref 13–16)
LDLC SERPL CALC-MCNC: 87 MG/DL (ref 0–100)
LIPID PROFILE,FLP: NORMAL
LYMPHOCYTES # BLD: 1.1 K/UL (ref 0.9–3.6)
LYMPHOCYTES NFR BLD: 23 % (ref 21–52)
MCH RBC QN AUTO: 30.6 PG (ref 24–34)
MCHC RBC AUTO-ENTMCNC: 32.8 G/DL (ref 31–37)
MCV RBC AUTO: 93.3 FL (ref 74–97)
MONOCYTES # BLD: 0.5 K/UL (ref 0.05–1.2)
MONOCYTES NFR BLD: 11 % (ref 3–10)
NEUTS SEG # BLD: 3.1 K/UL (ref 1.8–8)
NEUTS SEG NFR BLD: 63 % (ref 40–73)
PLATELET # BLD AUTO: 212 K/UL (ref 135–420)
PMV BLD AUTO: 10.6 FL (ref 9.2–11.8)
POTASSIUM SERPL-SCNC: 4.1 MMOL/L (ref 3.5–5.5)
PROT SERPL-MCNC: 6.3 G/DL (ref 6.4–8.2)
RBC # BLD AUTO: 4.35 M/UL (ref 4.7–5.5)
SODIUM SERPL-SCNC: 146 MMOL/L (ref 136–145)
TRIGL SERPL-MCNC: 80 MG/DL (ref ?–150)
TSH SERPL DL<=0.05 MIU/L-ACNC: 1.16 UIU/ML (ref 0.36–3.74)
VLDLC SERPL CALC-MCNC: 16 MG/DL
WBC # BLD AUTO: 4.9 K/UL (ref 4.6–13.2)

## 2021-02-23 PROCEDURE — 36415 COLL VENOUS BLD VENIPUNCTURE: CPT

## 2021-02-23 PROCEDURE — 84436 ASSAY OF TOTAL THYROXINE: CPT

## 2021-02-23 PROCEDURE — 80053 COMPREHEN METABOLIC PANEL: CPT

## 2021-02-23 PROCEDURE — 84443 ASSAY THYROID STIM HORMONE: CPT

## 2021-02-23 PROCEDURE — 85025 COMPLETE CBC W/AUTO DIFF WBC: CPT

## 2021-02-23 PROCEDURE — 80061 LIPID PANEL: CPT

## 2021-02-24 LAB — T4 SERPL-MCNC: 9.8 UG/DL (ref 4.5–12.1)

## 2021-03-03 ENCOUNTER — OFFICE VISIT (OUTPATIENT)
Dept: CARDIOLOGY CLINIC | Age: 56
End: 2021-03-03
Payer: MEDICAID

## 2021-03-03 VITALS
HEIGHT: 69 IN | TEMPERATURE: 98.6 F | HEART RATE: 80 BPM | OXYGEN SATURATION: 99 % | WEIGHT: 108 LBS | SYSTOLIC BLOOD PRESSURE: 111 MMHG | BODY MASS INDEX: 16 KG/M2 | DIASTOLIC BLOOD PRESSURE: 69 MMHG

## 2021-03-03 DIAGNOSIS — E78.5 HYPERLIPIDEMIA, UNSPECIFIED HYPERLIPIDEMIA TYPE: ICD-10-CM

## 2021-03-03 DIAGNOSIS — I10 ESSENTIAL HYPERTENSION: ICD-10-CM

## 2021-03-03 DIAGNOSIS — I34.0 NON-RHEUMATIC MITRAL REGURGITATION: Primary | ICD-10-CM

## 2021-03-03 DIAGNOSIS — I34.1 MVP (MITRAL VALVE PROLAPSE): ICD-10-CM

## 2021-03-03 DIAGNOSIS — J44.9 CHRONIC OBSTRUCTIVE PULMONARY DISEASE, UNSPECIFIED COPD TYPE (HCC): ICD-10-CM

## 2021-03-03 PROCEDURE — 99214 OFFICE O/P EST MOD 30 MIN: CPT | Performed by: INTERNAL MEDICINE

## 2021-03-03 NOTE — PROGRESS NOTES
1. Have you been to the ER, urgent care clinic since your last visit? Hospitalized since your last visit? No    2. Have you seen or consulted any other health care providers outside of the [address] [place] [address] since your last visit? Include any pap smears or colon screening.  No

## 2021-03-03 NOTE — PROGRESS NOTES
HISTORY OF PRESENT ILLNESS  Ronak Diallo. is a 54 y.o. male. 9/2020 virtual visit  Patient seen today for virtual visit. He is here for follow-up. Patient had mitral valve prolapse mitral regurgitation. Patient shortness of breath on exertion also feels short of breath at night. He has emphysema. Denies any edema denies any chest pain  3/2021  Patient seen today for follow-up. Patient shortness of breath on exertion    Chest Pain (Angina)   The history is provided by the patient. This is a new problem. The current episode started more than 1 week ago. The problem has not changed since onset. The problem occurs every several days. The pain is associated with rest. The pain is present in the left side and substernal region. The pain is mild. The quality of the pain is described as sharp. The pain does not radiate. Associated symptoms include shortness of breath. Pertinent negatives include no claudication, no cough, no dizziness, no fever, no hemoptysis, no nausea, no orthopnea, no palpitations, no PND, no sputum production, no vomiting and no weakness. Shortness of Breath  The history is provided by the patient. This is a recurrent problem. The problem occurs frequently. The current episode started more than 1 week ago. The problem has not changed since onset. Associated symptoms include chest pain. Pertinent negatives include no fever, no cough, no sputum production, no hemoptysis, no wheezing, no PND, no orthopnea, no vomiting, no rash, no leg swelling and no claudication. Precipitated by: exertion. Review of Systems   Constitutional: Negative for chills and fever. HENT: Negative for nosebleeds. Eyes: Negative for blurred vision and double vision. Respiratory: Positive for shortness of breath. Negative for cough, hemoptysis, sputum production and wheezing. Cardiovascular: Positive for chest pain. Negative for palpitations, orthopnea, claudication, leg swelling and PND. Gastrointestinal: Negative for heartburn, nausea and vomiting. Musculoskeletal: Negative for myalgias. Skin: Negative for rash. Neurological: Negative for dizziness and weakness. Endo/Heme/Allergies: Does not bruise/bleed easily. Family History   Problem Relation Age of Onset    Heart Attack Father     Heart Disease Mother         Ischemic  heart disease       Past Medical History:   Diagnosis Date    CAD (coronary artery disease)     Chest pain, unspecified     Chronic airway obstruction, not elsewhere classified     Chronic lung disease     COPD     Coronary atherosclerosis of native coronary artery     Noncritical left main disease    Essential hypertension, benign     Fatigue     Hepatitis B     History of hepatitis B    Hypercholesteremia     Hypertension     [facility]     Nonrheumatic mitral valve regurgitation 4/14/2020    Other and unspecified hyperlipidemia     Pneumothorax 2005    Psychiatric disorder     depression    Rectal bleeding     Stroke ([facility] 2005    TIA - weakness - generalized    Weight loss        Past Surgical History:   Procedure Laterality Date    HX OTHER SURGICAL      Lung collapse       Social History     Tobacco Use    Smoking status: Current Every Day Smoker     Packs/day: 0.50     Years: 30.00     Pack years: 15.00     Types: Cigarettes    Smokeless tobacco: Never Used   Substance Use Topics    Alcohol use: Not Currently       No Known Allergies    Prior to Admission medications    Medication Sig Start Date End Date Taking? Authorizing Provider   tiotropium-olodateroL (Stiolto Respimat) 2.5-2.5 mcg/actuation inhaler Take  by inhalation. Yes Provider, Historical   amLODIPine (NORVASC) 5 mg tablet TAKE 1 TABLET BY MOUTH ONCE DAILY 8/28/20  Yes Amina Tafoya NP   ibuprofen (MOTRIN) 800 mg tablet Take 800 mg by mouth every eight (8) hours as needed for Pain.    Yes Provider, Historical   cyclobenzaprine (FLEXERIL) 10 mg tablet Take 10 mg by mouth two (2) times a day. Yes Provider, Historical   ipratropium-albuterol (COMBIVENT RESPIMAT)  mcg/actuation inhaler Take 1 Puff by inhalation every six (6) hours. Yes Provider, Historical   nitroglycerin (NITROLINGUAL) 400 mcg/spray spray 1 Spray by SubLINGual route every five (5) minutes as needed for Chest Pain. 8/21/19  Yes Flako Duenas MD   metoprolol (LOPRESSOR) 25 mg tablet Take 25 mg by mouth daily. Yes Provider, Historical   simvastatin (ZOCOR) 20 mg tablet Take 20 mg by mouth nightly. Yes Provider, Historical   aspirin 81 mg tablet Take 81 mg by mouth daily. Yes Provider, Historical   hydrocodone-acetaminophen (NORCO) 5-325 mg per tablet Take 1-2 tablets PO every 4-6 hours as needed for pain control. If over the counter ibuprofen or acetaminophen was suggested, then only take the vicodin for pain not well controlled with the over the counter medication. 9/4/14   Roselia Sandoval DO         Visit Vitals  /69 (BP 1 Location: Left arm, BP Patient Position: Sitting, BP Cuff Size: Adult)   Pulse 80   Temp 98.6 °F (37 °C) (Temporal)   Ht 5' 9\" (1.753 m)   Wt 49 kg (108 lb)   SpO2 99%   BMI 15.95 kg/m²         Physical Exam   Constitutional: He is oriented to person, place, and time. He appears well-developed and well-nourished. HENT:   Head: Normocephalic and atraumatic. Eyes: Conjunctivae are normal.   Neck: Neck supple. No JVD present. No tracheal deviation present. No thyromegaly present. Cardiovascular: Normal rate and regular rhythm. Exam reveals no gallop and no friction rub. Murmur heard. Holosystolic murmur is present with a grade of 3/6 at the lower right sternal border and apex. Pulmonary/Chest: Breath sounds normal. No respiratory distress. He has no wheezes. He has no rales. He exhibits no tenderness. Abdominal: Soft. There is no abdominal tenderness. Musculoskeletal:         General: No edema.    Neurological: He is alert and oriented to person, place, and time. Skin: Skin is warm and dry. Psychiatric: He has a normal mood and affect. Mr. Whitley Rubalcava has a reminder for a \"due or due soon\" health maintenance. I have asked that he contact his primary care provider for follow-up on this health maintenance. No flowsheet data found. Interpretation Summary 8/2019    · Left Ventricle: Normal cavity size, wall thickness and systolic function (ejection fraction normal). Estimated left ventricular ejection fraction is 56 - 60%. No regional wall motion abnormality noted. Moderate (grade 2) left ventricular diastolic dysfunction. · Right Ventricle: Normal right ventricular size and function. · Mitral Valve: Mitral valve prolapse of the anterior leaflet. Moderate mitral valve regurgitation. · Tricuspid Valve: Mild tricuspid valve regurgitation is present. · Pulmonary Artery: There is no evidence of pulmonary hypertension. · Pulmonic Valve: Mild pulmonic valve regurgitation is present. · Aorta: Mild aortic root dilatation. Procedure Conclusion 8/2019    Nuclear Stress Test     Negative myocardial perfusion imaging. Myocardial perfusion imaging supports a low risk stress test.   There is a prior study available for comparison. Assessment         ICD-10-CM ICD-9-CM    1. Non-rheumatic mitral regurgitation  I34.0 424.0 ECHO ADULT COMPLETE    Shortness of breath on exertion moderate mitral vegetation with MVP in the past.   2. Chronic obstructive pulmonary disease, unspecified COPD type ([facility]  J44.9 496     Continue treatment follow-up with PCP   3. MVP (mitral valve prolapse)  I34.1 424.0 ECHO ADULT COMPLETE    Monitor   4. Essential hypertension  I10 401.9     Stable moderate   5. Hyperlipidemia, unspecified hyperlipidemia type  E78.5 272.4     Treatment follow-up lab with PCP   8/2019   mitral valve prolapse with moderate mitral regurgitation. Mildly elevated blood pressure.   Add amlodipine continue to monitor. 9/2020 virtual visit  Shortness of breath stable. Pulmonary wanted to start patient on trilogy I have called in that prescription. He will call pulmonary to decide on which inhaler he will be stopping. Continue other treatment cardiac status stable    Medications Discontinued During This Encounter   Medication Reason    fluticasone-umeclidinium-vilanterol (Trelegy Ellipta) 100-62.5-25 mcg inhaler Not A Current Medication    omeprazole (PRILOSEC) 20 mg capsule Not A Current Medication       Orders Placed This Encounter    ECHO ADULT COMPLETE     Standing Status:   Future     Standing Expiration Date:   3/3/2022     Order Specific Question:   Contrast Enhancement (Bubble Study, Definity, Optison) may be used if criteria listed in established evidence-based protocol has been identified. Answer:   Yes       Follow-up and Dispositions    · Return for F/u after tests, Follow-up with Danielle.

## 2021-03-12 RX ORDER — AMLODIPINE BESYLATE 5 MG/1
TABLET ORAL
Qty: 30 TAB | Refills: 6 | Status: SHIPPED | OUTPATIENT
Start: 2021-03-12

## 2021-07-22 RX ORDER — NITROGLYCERIN 400 UG/1
SPRAY ORAL
Qty: 12 G | Refills: 1 | Status: SHIPPED | OUTPATIENT
Start: 2021-07-22 | End: 2022-07-12 | Stop reason: SDUPTHER

## 2022-04-05 ENCOUNTER — HOSPITAL ENCOUNTER (OUTPATIENT)
Dept: LAB | Age: 57
Discharge: HOME | End: 2022-04-05

## 2022-04-05 LAB — XX-LABCORP SPECIMEN COL,LCBCF: NORMAL

## 2022-04-05 PROCEDURE — 99001 SPECIMEN HANDLING PT-LAB: CPT

## 2022-07-12 RX ORDER — NITROGLYCERIN 400 UG/1
SPRAY ORAL
Qty: 12 G | Refills: 1 | Status: SHIPPED | OUTPATIENT
Start: 2022-07-12

## 2022-07-12 NOTE — TELEPHONE ENCOUNTER
Requested Prescriptions     Pending Prescriptions Disp Refills    nitroglycerin (NITROLINGUAL) 400 mcg/spray spray 12 g 1     Sig: USE 1 SPRAY BY SUBLINGUAL ROUTE EVERY FIVE (5) MINUTES AS NEEDED FOR CHEST PAIN.

## 2022-07-26 ENCOUNTER — TELEPHONE (OUTPATIENT)
Dept: CARDIOLOGY CLINIC | Age: 57
End: 2022-07-26

## 2022-07-26 ENCOUNTER — OFFICE VISIT (OUTPATIENT)
Dept: CARDIOLOGY CLINIC | Age: 57
End: 2022-07-26
Payer: MEDICAID

## 2022-07-26 VITALS
DIASTOLIC BLOOD PRESSURE: 91 MMHG | HEART RATE: 86 BPM | BODY MASS INDEX: 15.85 KG/M2 | SYSTOLIC BLOOD PRESSURE: 140 MMHG | HEIGHT: 69 IN | WEIGHT: 107 LBS | OXYGEN SATURATION: 96 %

## 2022-07-26 DIAGNOSIS — I34.0 NON-RHEUMATIC MITRAL REGURGITATION: ICD-10-CM

## 2022-07-26 DIAGNOSIS — I34.1 MVP (MITRAL VALVE PROLAPSE): ICD-10-CM

## 2022-07-26 DIAGNOSIS — R07.9 CHEST PAIN, UNSPECIFIED TYPE: Primary | ICD-10-CM

## 2022-07-26 DIAGNOSIS — E78.5 HYPERLIPIDEMIA, UNSPECIFIED HYPERLIPIDEMIA TYPE: ICD-10-CM

## 2022-07-26 DIAGNOSIS — I10 ESSENTIAL HYPERTENSION: ICD-10-CM

## 2022-07-26 DIAGNOSIS — R06.09 DYSPNEA ON EXERTION: ICD-10-CM

## 2022-07-26 PROCEDURE — 99214 OFFICE O/P EST MOD 30 MIN: CPT | Performed by: NURSE PRACTITIONER

## 2022-07-26 PROCEDURE — 93000 ELECTROCARDIOGRAM COMPLETE: CPT | Performed by: NURSE PRACTITIONER

## 2022-07-26 RX ORDER — GLUCOSA SU 2KCL/CHONDROITIN SU 500-400 MG
CAPSULE ORAL
COMMUNITY
Start: 2022-07-08

## 2022-07-26 RX ORDER — OMEPRAZOLE 20 MG/1
20 CAPSULE, DELAYED RELEASE ORAL DAILY
COMMUNITY
Start: 2022-07-08

## 2022-07-26 RX ORDER — METOPROLOL TARTRATE 25 MG/1
25 TABLET, FILM COATED ORAL 2 TIMES DAILY
Qty: 180 TABLET | Refills: 4 | Status: SHIPPED | OUTPATIENT
Start: 2022-07-26

## 2022-07-26 RX ORDER — PREDNISONE 20 MG/1
TABLET ORAL
COMMUNITY
Start: 2022-07-12

## 2022-07-26 RX ORDER — DOXYCYCLINE 100 MG/1
CAPSULE ORAL
COMMUNITY
Start: 2022-05-17

## 2022-07-26 RX ORDER — PAROXETINE HYDROCHLORIDE 20 MG/1
20 TABLET, FILM COATED ORAL DAILY
COMMUNITY
Start: 2022-07-08

## 2022-07-26 RX ORDER — ATORVASTATIN CALCIUM 20 MG/1
20 TABLET, FILM COATED ORAL DAILY
Qty: 90 TABLET | Refills: 2 | Status: SHIPPED | OUTPATIENT
Start: 2022-07-26

## 2022-07-26 NOTE — TELEPHONE ENCOUNTER
Spoke with pt advised per [facility] NP increased LDL, D/C Simvastatin and begin Atorvastatin 20 mg. Pt with no questions or concerns at this time.

## 2022-07-26 NOTE — PROGRESS NOTES
HISTORY OF PRESENT ILLNESS  Edsel Kocher. is a 64 y.o. male. 9/2020 virtual visit  Patient seen today for virtual visit. He is here for follow-up. Patient had mitral valve prolapse mitral regurgitation. Patient shortness of breath on exertion also feels short of breath at night. He has emphysema. Denies any edema denies any chest pain  3/2021  Patient seen today for follow-up. Patient shortness of breath on exertion  7/2022  Patient seen today for c/o shortness of breath and chest pain which occurs with activity and rest. Episodes typically last 10 minutes and resolve. He denies associated diaphoresis, nausea or palpitations. Chest Pain (Angina)   The history is provided by the Patient. This is a new problem. The current episode started more than 1 week ago. The problem has not changed since onset. The problem occurs every several days. The pain is associated with rest. The pain is present in the left side and substernal region. The pain is mild. The quality of the pain is described as sharp. The pain does not radiate. Associated symptoms include shortness of breath. Pertinent negatives include no claudication, no cough, no dizziness, no fever, no hemoptysis, no nausea, no orthopnea, no palpitations, no PND, no sputum production, no vomiting and no weakness. Shortness of Breath  The history is provided by the Patient. This is a recurrent problem. The problem occurs frequently. The current episode started more than 1 week ago. The problem has not changed since onset. Associated symptoms include chest pain. Pertinent negatives include no fever, no cough, no sputum production, no hemoptysis, no wheezing, no PND, no orthopnea, no vomiting, no rash, no leg swelling and no claudication. Precipitated by: exertion. Follow-up  Associated symptoms include chest pain and shortness of breath. Review of Systems   Constitutional:  Negative for chills and fever. HENT:  Negative for nosebleeds.     Eyes: Negative for blurred vision and double vision. Respiratory:  Positive for shortness of breath. Negative for cough, hemoptysis, sputum production and wheezing. Cardiovascular:  Positive for chest pain. Negative for palpitations, orthopnea, claudication, leg swelling and PND. Gastrointestinal:  Negative for heartburn, nausea and vomiting. Musculoskeletal:  Negative for myalgias. Skin:  Negative for rash. Neurological:  Negative for dizziness and weakness. Endo/Heme/Allergies:  Does not bruise/bleed easily. Family History   Problem Relation Age of Onset    Heart Attack Father     Heart Disease Mother         Ischemic  heart disease       Past Medical History:   Diagnosis Date    CAD (coronary artery disease)     Chest pain, unspecified     Chronic airway obstruction, not elsewhere classified     Chronic lung disease     COPD     Coronary atherosclerosis of native coronary artery     Noncritical left main disease    Essential hypertension, benign     Fatigue     Hepatitis B     History of hepatitis B    Hypercholesteremia     Hypertension     Intermediate coronary syndrome ([facility] [address]     Nonrheumatic mitral valve regurgitation 4/14/2020    Other and unspecified hyperlipidemia     Pneumothorax 2005    Psychiatric disorder     depression    Rectal bleeding     Stroke ([facility] [address] 2005    TIA - weakness - generalized    Weight loss        Past Surgical History:   Procedure Laterality Date    HX OTHER SURGICAL      Lung collapse       Social History     Tobacco Use    Smoking status: Every Day     Packs/day: 0.50     Years: 30.00     Pack years: 15.00     Types: Cigarettes    Smokeless tobacco: Never   Substance Use Topics    Alcohol use: Not Currently       No Known Allergies    Prior to Admission medications    Medication Sig Start Date End Date Taking?  Authorizing Provider   doxycycline (MONODOX) 100 mg capsule TAKE 1 CAPSULE BY MOUTH 2 TIMES A DAY 5/17/22  Yes Provider, Historical   omeprazole (PRILOSEC) 20 mg capsule Take 20 mg by mouth in the morning. 7/8/22  Yes Provider, Historical   PARoxetine (PAXIL) 20 mg tablet Take 20 mg by mouth in the morning. 7/8/22  Yes Provider, Historical   Therapeutic-M 9 mg iron-400 mcg tab tablet  7/8/22  Yes Provider, Historical   predniSONE (DELTASONE) 20 mg tablet TAKE 1 TABLET BY MOUTH 2 TIMES A DAY 7/12/22  Yes Provider, Historical   metoprolol tartrate (LOPRESSOR) 25 mg tablet Take 1 Tablet by mouth two (2) times a day. 7/26/22  Yes Danielle Dumont NP   atorvastatin (LIPITOR) 20 mg tablet Take 1 Tablet by mouth in the morning. 7/26/22  Yes Danielle Dumont NP   nitroglycerin (NITROLINGUAL) 400 mcg/spray spray USE 1 SPRAY BY SUBLINGUAL ROUTE EVERY FIVE (5) MINUTES AS NEEDED FOR CHEST PAIN. 7/12/22  Yes Danielle Dumont NP   amLODIPine (NORVASC) 5 mg tablet TAKE 1 TABLET BY MOUTH ONCE DAILY 3/12/21  Yes Amina Tafoya NP   tiotropium-olodateroL (Stiolto Respimat) 2.5-2.5 mcg/actuation inhaler Take  by inhalation. Yes Provider, Historical   ibuprofen (MOTRIN) 800 mg tablet Take 800 mg by mouth every eight (8) hours as needed for Pain. Yes Provider, Historical   cyclobenzaprine (FLEXERIL) 10 mg tablet Take 10 mg by mouth two (2) times a day. Yes Provider, Historical   ipratropium-albuteroL (COMBIVENT RESPIMAT)  mcg/actuation inhaler Take 1 Puff by inhalation every six (6) hours. Yes Provider, Historical   aspirin 81 mg tablet Take 81 mg by mouth daily. Yes Provider, Historical         Visit Vitals  BP (!) 140/91   Pulse 86   Ht 5' 9\" (1.753 m)   Wt 48.5 kg (107 lb)   SpO2 96%   BMI 15.80 kg/m²         Physical Exam  Constitutional:       Appearance: Normal appearance. He is well-developed. HENT:      Head: Normocephalic and atraumatic. Eyes:      Conjunctiva/sclera: Conjunctivae normal.   Neck:      Thyroid: No thyromegaly. Vascular: No JVD. Trachea: No tracheal deviation. Cardiovascular:      Rate and Rhythm: Normal rate and regular rhythm.       Heart sounds: Murmur heard. Holosystolic murmur is present with a grade of 3/6 at the lower right sternal border and apex. No friction rub. No gallop. Pulmonary:      Effort: Pulmonary effort is normal. No respiratory distress. Breath sounds: Normal breath sounds. No wheezing or rales. Chest:      Chest wall: No tenderness. Abdominal:      Palpations: Abdomen is soft. Tenderness: There is no abdominal tenderness. Musculoskeletal:      Cervical back: Neck supple. Right lower leg: No edema. Left lower leg: No edema. Skin:     General: Skin is warm and dry. Neurological:      Mental Status: He is alert and oriented to person, place, and time. Mr. Kevan Parker has a reminder for a \"due or due soon\" health maintenance. I have asked that he contact his primary care provider for follow-up on this health maintenance. No flowsheet data found. Interpretation Summary 8/2019    Left Ventricle: Normal cavity size, wall thickness and systolic function (ejection fraction normal). Estimated left ventricular ejection fraction is 56 - 60%. No regional wall motion abnormality noted. Moderate (grade 2) left ventricular diastolic dysfunction. Right Ventricle: Normal right ventricular size and function. Mitral Valve: Mitral valve prolapse of the anterior leaflet. Moderate mitral valve regurgitation. Tricuspid Valve: Mild tricuspid valve regurgitation is present. Pulmonary Artery: There is no evidence of pulmonary hypertension. Pulmonic Valve: Mild pulmonic valve regurgitation is present. Aorta: Mild aortic root dilatation. Procedure Conclusion 8/2019    Nuclear Stress Test     Negative myocardial perfusion imaging. Myocardial perfusion imaging supports a low risk stress test.   There is a prior study available for comparison. Assessment         ICD-10-CM ICD-9-CM    1.  Chest pain, unspecified type  R07.9 786.50 AMB POC EKG ROUTINE W/ 12 LEADS, INTER & REP      NUCLEAR CARDIAC STRESS TEST      ECHO ADULT COMPLETE      2. Non-rheumatic mitral regurgitation  I34.0 424.0       3. MVP (mitral valve prolapse)  I34.1 424.0       4. Essential hypertension  I10 401.9 metoprolol tartrate (LOPRESSOR) 25 mg tablet      5. Hyperlipidemia, unspecified hyperlipidemia type  E78.5 272.4 atorvastatin (LIPITOR) 20 mg tablet      LIPID PANEL      6. Dyspnea on exertion  R06.00 786.09 NUCLEAR CARDIAC STRESS TEST      ECHO ADULT COMPLETE      8/2019   mitral valve prolapse with moderate mitral regurgitation. Mildly elevated blood pressure. Add amlodipine continue to monitor. 9/2020 virtual visit  Shortness of breath stable. Pulmonary wanted to start patient on trilogy I have called in that prescription. He will call pulmonary to decide on which inhaler he will be stopping. Continue other treatment cardiac status stable  7/2022  Patient seen for c/o chest pain and shortness of breath. In office EKG SR with peaked anterior T waves - Labs received from PCP office potassium WNL -    - will d/c Zocor and begin Atorvastatin 20 mg/ day and check Lipids and LFT in 4 weeks. Will evaluated for ischemia with nuclear stress test and follow MVP with repeat echo. Will resume Metoprolol 25 mg / BID which will aid in palpitations and HTN.       Medications Discontinued During This Encounter   Medication Reason    hydrocodone-acetaminophen (NORCO) 5-325 mg per tablet Therapy Completed    metoprolol (LOPRESSOR) 25 mg tablet REORDER    simvastatin (ZOCOR) 20 mg tablet Other         Orders Placed This Encounter    LIPID PANEL     Standing Status:   Future     Standing Expiration Date:   7/27/2023    AMB POC EKG ROUTINE W/ 12 LEADS, INTER & REP     Order Specific Question:   Reason for Exam:     Answer:   chest pain    ECHO ADULT COMPLETE     Standing Status:   Future     Standing Expiration Date:   7/26/2023     Order Specific Question:   Contrast Enhancement (Bubble Study, Definity, Optison) may be used if criteria listed in established evidence-based protocol has been identified. Answer:   Yes     Order Specific Question:   Enhanced Imaging (Myocardial Strain, 3D post-processing) may be used if criteria listed in established evidence-based protocol has been identified. Answer: Yes    metoprolol tartrate (LOPRESSOR) 25 mg tablet     Sig: Take 1 Tablet by mouth two (2) times a day. Dispense:  180 Tablet     Refill:  4    atorvastatin (LIPITOR) 20 mg tablet     Sig: Take 1 Tablet by mouth in the morning. Dispense:  90 Tablet     Refill:  2       Follow-up and Dispositions    Return in about 2 years (around 7/26/2024) for Post testing.

## 2022-11-08 ENCOUNTER — OFFICE VISIT (OUTPATIENT)
Dept: CARDIOLOGY CLINIC | Age: 57
End: 2022-11-08
Payer: MEDICAID

## 2022-11-08 VITALS
WEIGHT: 108 LBS | DIASTOLIC BLOOD PRESSURE: 86 MMHG | BODY MASS INDEX: 16 KG/M2 | SYSTOLIC BLOOD PRESSURE: 132 MMHG | OXYGEN SATURATION: 99 % | HEART RATE: 89 BPM | HEIGHT: 69 IN

## 2022-11-08 DIAGNOSIS — I77.810 AORTIC ROOT DILATION (HCC): ICD-10-CM

## 2022-11-08 DIAGNOSIS — J44.9 CHRONIC OBSTRUCTIVE PULMONARY DISEASE, UNSPECIFIED COPD TYPE (HCC): ICD-10-CM

## 2022-11-08 DIAGNOSIS — I10 ESSENTIAL HYPERTENSION: ICD-10-CM

## 2022-11-08 DIAGNOSIS — I34.1 MVP (MITRAL VALVE PROLAPSE): ICD-10-CM

## 2022-11-08 DIAGNOSIS — R07.9 CHEST PAIN, UNSPECIFIED TYPE: Primary | ICD-10-CM

## 2022-11-08 DIAGNOSIS — Z72.0 TOBACCO ABUSE: ICD-10-CM

## 2022-11-08 DIAGNOSIS — I34.0 NON-RHEUMATIC MITRAL REGURGITATION: ICD-10-CM

## 2022-11-08 DIAGNOSIS — E78.5 HYPERLIPIDEMIA, UNSPECIFIED HYPERLIPIDEMIA TYPE: ICD-10-CM

## 2022-11-08 PROCEDURE — 3074F SYST BP LT 130 MM HG: CPT | Performed by: NURSE PRACTITIONER

## 2022-11-08 PROCEDURE — 99214 OFFICE O/P EST MOD 30 MIN: CPT | Performed by: NURSE PRACTITIONER

## 2022-11-08 PROCEDURE — 3078F DIAST BP <80 MM HG: CPT | Performed by: NURSE PRACTITIONER

## 2022-11-08 RX ORDER — NITROGLYCERIN 400 UG/1
SPRAY ORAL
Qty: 12 G | Refills: 1 | Status: SHIPPED | OUTPATIENT
Start: 2022-11-08

## 2022-11-08 NOTE — PROGRESS NOTES
1. Have you been to the ER, urgent care clinic since your last visit? Hospitalized since your last visit?     no    2. Have you seen or consulted any other health care providers outside of the [address] [place] [address] since your last visit? Include any pap smears or colon screening. No     3. Since your last visit, have you had any of the following symptoms? chest pains and shortness of breath.

## 2022-11-08 NOTE — PROGRESS NOTES
HISTORY OF PRESENT ILLNESS  Jose Kiran is a 64 y.o. male. 9/2020 virtual visit  Patient seen today for virtual visit. He is here for follow-up. Patient had mitral valve prolapse mitral regurgitation. Patient shortness of breath on exertion also feels short of breath at night. He has emphysema. Denies any edema denies any chest pain  3/2021  Patient seen today for follow-up. Patient shortness of breath on exertion  7/2022  Patient seen today for c/o shortness of breath and chest pain which occurs with activity and rest. Episodes typically last 10 minutes and resolve. He denies associated diaphoresis, nausea or palpitations. 11/2022  Patient seen in follow-up for shortness of breath and chest pain which typically occurs with rest.  He denies palpitations or edema. He reports has recently run out of all medications but pick them up today. He has not had follow-up with pulmonology in quite some time. He continues to smoke 2 packs/day. Chest Pain (Angina)   The history is provided by the Patient. This is a new problem. The current episode started more than 1 week ago. The problem has not changed since onset. The problem occurs every several days. The pain is associated with rest. The pain is present in the left side and substernal region. The pain is mild. The quality of the pain is described as sharp. The pain does not radiate. Associated symptoms include shortness of breath. Pertinent negatives include no claudication, no cough, no dizziness, no fever, no hemoptysis, no nausea, no orthopnea, no palpitations, no PND, no sputum production, no vomiting and no weakness. Shortness of Breath  The history is provided by the Patient. This is a recurrent problem. The problem occurs frequently. The current episode started more than 1 week ago. The problem has not changed since onset. Associated symptoms include chest pain.  Pertinent negatives include no fever, no cough, no sputum production, no hemoptysis, no wheezing, no PND, no orthopnea, no vomiting, no rash, no leg swelling and no claudication. Precipitated by: exertion. Follow-up  Associated symptoms include chest pain and shortness of breath. Review of Systems   Constitutional:  Negative for chills and fever. HENT:  Negative for nosebleeds. Eyes:  Negative for blurred vision and double vision. Respiratory:  Positive for shortness of breath. Negative for cough, hemoptysis, sputum production and wheezing. Cardiovascular:  Positive for chest pain. Negative for palpitations, orthopnea, claudication, leg swelling and PND. Gastrointestinal:  Negative for heartburn, nausea and vomiting. Musculoskeletal:  Negative for myalgias. Skin:  Negative for rash. Neurological:  Negative for dizziness and weakness. Endo/Heme/Allergies:  Does not bruise/bleed easily.    Family History   Problem Relation Age of Onset    Heart Attack Father     Heart Disease Mother         Ischemic  heart disease       Past Medical History:   Diagnosis Date    CAD (coronary artery disease)     Chest pain, unspecified     Chronic airway obstruction, not elsewhere classified     Chronic lung disease     COPD     Coronary atherosclerosis of native coronary artery     Noncritical left main disease    Essential hypertension, benign     Fatigue     Hepatitis B     History of hepatitis B    Hypercholesteremia     Hypertension     Intermediate coronary syndrome ([address]     Nonrheumatic mitral valve regurgitation 4/14/2020    Other and unspecified hyperlipidemia     Pneumothorax 2005    Psychiatric disorder     depression    Rectal bleeding     Stroke ([address] 2005    TIA - weakness - generalized    Weight loss        Past Surgical History:   Procedure Laterality Date    HX OTHER SURGICAL      Lung collapse       Social History     Tobacco Use    Smoking status: Every Day     Packs/day: 0.50     Years: 30.00     Pack years: 15.00     Types: Cigarettes    Smokeless tobacco: Never   Substance Use Topics    Alcohol use: Not Currently       No Known Allergies    Prior to Admission medications    Medication Sig Start Date End Date Taking? Authorizing Provider   nitroglycerin (NITROLINGUAL) 400 mcg/spray spray USE 1 SPRAY BY SUBLINGUAL ROUTE EVERY FIVE (5) MINUTES AS NEEDED FOR CHEST PAIN. 11/8/22  Yes Danielle Dumont NP   doxycycline (MONODOX) 100 mg capsule TAKE 1 CAPSULE BY MOUTH 2 TIMES A DAY 5/17/22  Yes Provider, Historical   omeprazole (PRILOSEC) 20 mg capsule Take 20 mg by mouth in the morning. 7/8/22  Yes Provider, Historical   PARoxetine (PAXIL) 20 mg tablet Take 20 mg by mouth in the morning. 7/8/22  Yes Provider, Historical   Therapeutic-M 9 mg iron-400 mcg tab tablet  7/8/22  Yes Provider, Historical   predniSONE (DELTASONE) 20 mg tablet TAKE 1 TABLET BY MOUTH 2 TIMES A DAY 7/12/22  Yes Provider, Historical   metoprolol tartrate (LOPRESSOR) 25 mg tablet Take 1 Tablet by mouth two (2) times a day. 7/26/22  Yes Danielle Dumont NP   atorvastatin (LIPITOR) 20 mg tablet Take 1 Tablet by mouth in the morning. 7/26/22  Yes Danielle Dumont NP   amLODIPine (NORVASC) 5 mg tablet TAKE 1 TABLET BY MOUTH ONCE DAILY 3/12/21  Yes Amina Tafoya NP   tiotropium-olodateroL (Stiolto Respimat) 2.5-2.5 mcg/actuation inhaler Take  by inhalation. Yes Provider, Historical   ibuprofen (MOTRIN) 800 mg tablet Take 800 mg by mouth every eight (8) hours as needed for Pain. Yes Provider, Historical   cyclobenzaprine (FLEXERIL) 10 mg tablet Take 10 mg by mouth two (2) times a day. Yes Provider, Historical   ipratropium-albuteroL (COMBIVENT RESPIMAT)  mcg/actuation inhaler Take 1 Puff by inhalation every six (6) hours. Yes Provider, Historical   aspirin 81 mg tablet Take 81 mg by mouth daily.    Yes Provider, Historical   nitroglycerin (NITROLINGUAL) 400 mcg/spray spray USE 1 SPRAY BY SUBLINGUAL ROUTE EVERY FIVE (5) MINUTES AS NEEDED FOR CHEST PAIN. 7/12/22 11/8/22  Shirley Fisher NP Visit Vitals  /86   Pulse 89   Ht 5' 9\" (1.753 m)   Wt 49 kg (108 lb)   SpO2 99%   BMI 15.95 kg/m²         Physical Exam  Vitals and nursing note reviewed. Constitutional:       Appearance: Normal appearance. He is well-developed. HENT:      Head: Normocephalic and atraumatic. Eyes:      Conjunctiva/sclera: Conjunctivae normal.   Neck:      Thyroid: No thyromegaly. Vascular: No JVD. Trachea: No tracheal deviation. Cardiovascular:      Rate and Rhythm: Normal rate and regular rhythm. Heart sounds: Murmur heard. Holosystolic murmur is present with a grade of 3/6 at the lower right sternal border and apex. No friction rub. No gallop. Pulmonary:      Effort: Pulmonary effort is normal. No respiratory distress. Breath sounds: Normal breath sounds. No wheezing or rales. Chest:      Chest wall: No tenderness. Abdominal:      Palpations: Abdomen is soft. Tenderness: There is no abdominal tenderness. Musculoskeletal:      Cervical back: Neck supple. Right lower leg: No edema. Left lower leg: No edema. Skin:     General: Skin is warm and dry. Neurological:      Mental Status: He is alert and oriented to person, place, and time. Mr. Michael Chaidez has a reminder for a \"due or due soon\" health maintenance. I have asked that he contact his primary care provider for follow-up on this health maintenance. No flowsheet data found. Interpretation Summary 8/2019    Left Ventricle: Normal cavity size, wall thickness and systolic function (ejection fraction normal). Estimated left ventricular ejection fraction is 56 - 60%. No regional wall motion abnormality noted. Moderate (grade 2) left ventricular diastolic dysfunction. Right Ventricle: Normal right ventricular size and function. Mitral Valve: Mitral valve prolapse of the anterior leaflet. Moderate mitral valve regurgitation.   Tricuspid Valve: Mild tricuspid valve regurgitation is present. Pulmonary Artery: There is no evidence of pulmonary hypertension. Pulmonic Valve: Mild pulmonic valve regurgitation is present. Aorta: Mild aortic root dilatation. Procedure Conclusion 8/2019    Nuclear Stress Test     Negative myocardial perfusion imaging. Myocardial perfusion imaging supports a low risk stress test.   There is a prior study available for comparison. 8/15/2022  Echo  Interpretation Summary         Left Ventricle: Normal left ventricular systolic function with a visually estimated EF of 55 - 60%. Left ventricle size is normal. Normal wall thickness. Normal wall motion. Normal diastolic function. Mitral Valve: Moderate regurgitation. Left Atrium: Left atrium is mildly dilated. LA Vol Index A/L is 36 mL/m2. Pulmonary Arteries: Moderate pulmonary hypertension present. The estimated PASP is 58 mmHg. Aorta: Moderately dilated aortic root. Ao Root diameter is 4.2 cm. Comparison Study Information    Prior Study    There is a prior study available for comparison. Prior study date: 8/23/2019. As compared to the previous study, there are significant changes. Pulmonary hypertension has increased. Aortic root was 3.8 cm and is now 4.2 cm.     8/15/2022  Stress test  Interpretation Summary         Nuclear Findings: Abnormal left ventricular systolic function post-stress. LVEF measures 38%. Nuclear Findings: LVEF measures 38%. LV perfusion is normal.    ECG: Resting ECG demonstrates normal sinus rhythm. ECG: Stress ECG was negative for ischemia. Stress Test: A pharmacological stress test was performed using lexiscan. Hemodynamics are adequate for diagnosis. Blood pressure demonstrated a normal response and heart rate demonstrated a normal response to stress. The patient's heart rate recovery was normal. The patient reported no symptoms during the stress test.  Assessment         ICD-10-CM ICD-9-CM    1.  Chest pain, unspecified type  R07.9 786.50     Atypical, nuclear stress test negative for ischemia      2. Non-rheumatic mitral regurgitation  I34.0 424.0     Shortness of breath on exertion moderate mitral vegetation with MVP in the past      3. MVP (mitral valve prolapse)  I34.1 424.0     stable Monitor      4. Essential hypertension  I10 401.9     controlled, continue current medications      5. Hyperlipidemia, unspecified hyperlipidemia type  E78.5 272.4     Continue statin, lab with PCP      6. Aortic root dilation (HCC)  I77.810 447.71     4.2 cm by Echo - monitor      7. Chronic obstructive pulmonary disease, unspecified COPD type ([facility] [address]  J44.9 496 REFERRAL TO PULMONARY DISEASE    Advised to follow up with pulmonary      8. Tobacco abuse  Z72.0 305.1     Encourage smoking cessation      8/2019   mitral valve prolapse with moderate mitral regurgitation. Mildly elevated blood pressure. Add amlodipine continue to monitor. 9/2020 virtual visit  Shortness of breath stable. Pulmonary wanted to start patient on trilogy I have called in that prescription. He will call pulmonary to decide on which inhaler he will be stopping. Continue other treatment cardiac status stable  7/2022  Patient seen for c/o chest pain and shortness of breath. In office EKG SR with peaked anterior T waves - Labs received from PCP office potassium WNL -    - will d/c Zocor and begin Atorvastatin 20 mg/ day and check Lipids and LFT in 4 weeks. Will evaluated for ischemia with nuclear stress test and follow MVP with repeat echo. Will resume Metoprolol 25 mg / BID which will aid in palpitations and HTN.  11/2022  Patient complained of shortness of breath, testing reviewed and discussed with patient. Nuclear stress test negative for ischemia. Echocardiogram with normal LV function, mitral valve prolapse, moderate mitral regurg. And moderate pulmonary hypertension. Patient reports has been out of medications and picked them up today to resume.   Advised to have repeat lipids and LFTs drawn as previously ordered. Patient referred back to pulmonary due to history of COPD and emphysema. Strongly encourage smoking cessation, as this is likely the cause of shortness of breath. Medications Discontinued During This Encounter   Medication Reason    nitroglycerin (NITROLINGUAL) 400 mcg/spray spray REORDER           Orders Placed This Encounter    REFERRAL TO PULMONARY DISEASE     Referral Priority:   Routine     Referral Type:   Consultation     Referral Reason:   Specialty Services Required     Referred to Provider:   Steve Cr MD     Requested Specialty:   Pulmonary Disease     Number of Visits Requested:   1    nitroglycerin (NITROLINGUAL) 400 mcg/spray spray     Sig: USE 1 SPRAY BY SUBLINGUAL ROUTE EVERY FIVE (5) MINUTES AS NEEDED FOR CHEST PAIN. Dispense:  12 g     Refill:  1         Follow-up and Dispositions    Return in about 6 months (around 5/8/2023) for Follow up with Dr. Tomer Aldridge.

## 2023-01-10 ENCOUNTER — OFFICE VISIT (OUTPATIENT)
Dept: PULMONOLOGY | Age: 58
End: 2023-01-10
Payer: MEDICAID

## 2023-01-10 VITALS
HEART RATE: 99 BPM | HEIGHT: 69 IN | SYSTOLIC BLOOD PRESSURE: 167 MMHG | BODY MASS INDEX: 15.55 KG/M2 | TEMPERATURE: 98.3 F | DIASTOLIC BLOOD PRESSURE: 90 MMHG | WEIGHT: 105 LBS | OXYGEN SATURATION: 95 % | RESPIRATION RATE: 20 BRPM

## 2023-01-10 DIAGNOSIS — Z87.891 PERSONAL HISTORY OF TOBACCO USE, PRESENTING HAZARDS TO HEALTH: ICD-10-CM

## 2023-01-10 DIAGNOSIS — R06.02 SOB (SHORTNESS OF BREATH): ICD-10-CM

## 2023-01-10 DIAGNOSIS — J44.9 STAGE 3 SEVERE COPD BY GOLD CLASSIFICATION (HCC): Primary | ICD-10-CM

## 2023-01-10 DIAGNOSIS — R05.3 CHRONIC COUGH: ICD-10-CM

## 2023-01-10 PROCEDURE — 94618 PULMONARY STRESS TESTING: CPT | Performed by: INTERNAL MEDICINE

## 2023-01-10 PROCEDURE — 99214 OFFICE O/P EST MOD 30 MIN: CPT | Performed by: INTERNAL MEDICINE

## 2023-01-10 RX ORDER — UMECLIDINIUM BROMIDE AND VILANTEROL TRIFENATATE 62.5; 25 UG/1; UG/1
1 POWDER RESPIRATORY (INHALATION) DAILY
Qty: 1 EACH | Refills: 0 | Status: SHIPPED | COMMUNITY
Start: 2023-01-10 | End: 2023-01-13 | Stop reason: SDUPTHER

## 2023-01-10 RX ORDER — METOPROLOL SUCCINATE 25 MG/1
25 TABLET, EXTENDED RELEASE ORAL DAILY
COMMUNITY
Start: 2023-01-02

## 2023-01-10 NOTE — PATIENT INSTRUCTIONS

## 2023-01-10 NOTE — PROGRESS NOTES
Lori Lulu. presents today for   Chief Complaint   Patient presents with    New Patient       Vitals:    01/10/23 1301 01/10/23 1306   BP: (!) 168/95 (!) 167/90   Pulse: 99    Resp: 20    Temp: 98.3 °F (36.8 °C)    TempSrc: Temporal    SpO2: 95%    Weight: 105 lb (47.6 kg)    Height: 5' 9\" (1.753 m)         Lori Hernandez preferred language for health care discussion is english/other. Is someone accompanying this pt? no    Is the patient using any DME equipment during [address]? no    Depression Screening:  3 most recent PHQ Screens 1/10/2023   Little interest or pleasure in doing things Not at all   Feeling down, depressed, irritable, or hopeless Not at all   Total Score PHQ 2 0       Learning Assessment:  No flowsheet data found. Abuse Screening:  No flowsheet data found. Generalized Anxiety  No flowsheet data found. Health Maintenance Due   Topic Date Due    Hepatitis C Screening  Never done    Depression Screen  Never done    COVID-19 Vaccine (1) Never done    Pneumococcal 0-64 years (1 - PCV) Never done    DTaP/Tdap/Td series (1 - Tdap) Never done    Colorectal Cancer Screening Combo  Never done    Shingles Vaccine (1 of 2) Never done    Low dose CT lung screening  Never done    Lipid Screen  02/23/2022    Flu Vaccine (1) Never done   . Health Maintenance reviewed and discussed and ordered per Provider. VACCINES DUE   SCREENINGS DUE       Lori Lawson. is updated on all HM    1. \"Have you been to the ER, urgent care clinic since your last visit? Hospitalized since your last visit? \" No    2. \"Have you seen or consulted any other health care providers outside of the [address] [place] [address] since your last visit? \" No

## 2023-01-10 NOTE — PROGRESS NOTES
[facility]  Pulmonary, Critical Care, and Sleep Medicine        Name: Tomas Ramirez. MRN: [id number]   : 1965 Hospital:    Date: 1/10/2023        IMPRESSION:   COPD-emphysema diagnosed in  with last pulmonary function test from . symptomatically has significant dyspnea on exertion and chronic persistent cough-CAT score more than 10. PFTs completed 2022-FEV1 44% predicted, midexpiratory flow rate 20% predicted with partial bronchodilator response. Normal alpha-1-MM  Nonrheumatic mitral valve regurg  Pulmonary hypertension-echo with PAP 58 mmHg group 2 and 3  Weight loss, cachexia, BMI 15.51  Essential hypertension  Current everyday smoker-history of 25-pack-year smoking, still 3/4 pack a day smoker        RECOMMENDATIONS:   Discussed with patient current diagnosis and need for appropriate maintenance therapy  Needs combination LABA and LAMA for maintenance with short acting for rescue. His insurance coverage is a barrier in choosing proper agent  I will provide a sample of Anoro and hope for insurance to pay  Combivent for rescue  Completed 6-minute walk and discussed with patient-currently no significant oxygen desaturation, will continue to monitor should there be any change  Complete cessation of cigarette smoking  Smoking cessation-The patient was counseled on the dangers of tobacco use, and was advised to quit. Reviewed strategies to maximize success, including removing cigarettes and smoking materials from environment. Meets criteria for annual LDCT screening-age, smoking years. Shared decision making completed and CT scan ordered  Preventive vaccinations-needs pneumococcal as well as COVID vaccines. Defer optimization of cardiac comorbidities to cardiology  Discussed need for healthy weight, proper nutrition  Follow-up in 2 to 3 months     Subjective:      This patient has been seen and evaluated at the request of Dr. Killian Rodriguez NP for shortness of breath and COPD.      01/10/23   Patient was initially evaluated in 2020 and since then was lost to follow-up due to the pandemic. He is here today at request of his cardiology team.  States that he has significant symptoms of shortness of breath with minimal activity  He keeps a dry cough  Has lost about 15 pounds in the last 6 months and finding it more difficult to carry out activities of daily living  He could not get his Stiolto refilled due to cost and has been using his Combivent frequently  Denies any chest pain  Denies pedal edema  At his last visit I had ordered alpha-1 antitrypsin which was resulted normal  He has recently had an echocardiogram and PFTs and is here for further follow-up    HPI  Patient is a 62 y.o. male is referred for evaluation of symptoms of shortness of breath and cough. Patient states that in 2005 he was diagnosed with COPD and since then has been told by his primary care physician that he has severe COPD. He has been a smoker smoking half to 1 pack of cigarettes per day for past 30 years. He is current every day smoker. He complains of cough which has been present for several years usually productive of some mucus in the morning and for majority of the time dry constant coughing. Some cough at night as well. Complains of shortness of breath on exertion-able to walk up to 50 feet but gets out of breath with climbing 4 steps into his house. He denies any significant orthopnea or paroxysmal nocturnal dyspnea  Denies any swelling of his lower extremities  Denies any chronic fever chills night sweats. 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 His father had COPD.   He is currently being treated with Stiolto inhaler and Combivent for rescue  He lives at home with his wife who has COPD and has 2 dogs at home  Denies any hospitalizations for COPD  Denies being on oxygen    I have reviewed all of the available data including the patient's previous history external records and radiological imaging available for review. He is followed by cardiology and was diagnosed with mitral valve regurgitation-on medical therapy  In addition applicable cardiology and other lab data were also reviewed. Past Medical History:   Diagnosis Date    CAD (coronary artery disease)     Chest pain, unspecified     Chronic airway obstruction, not elsewhere classified     Chronic lung disease     COPD     Coronary atherosclerosis of native coronary artery     Noncritical left main disease    Essential hypertension, benign     Fatigue     Hepatitis B     History of hepatitis B    Hypercholesteremia     Hypertension     Intermediate coronary syndrome (HCC)     Nonrheumatic mitral valve regurgitation 4/14/2020    Other and unspecified hyperlipidemia     Pneumothorax 2005    Psychiatric disorder     depression    Rectal bleeding     Stroke ([facility] [address] 2005    TIA - weakness - generalized    Weight loss       Past Surgical History:   Procedure Laterality Date    HX OTHER SURGICAL      Lung collapse      Prior to Admission medications    Medication Sig Start Date End Date Taking? Authorizing Provider   nitroglycerin (NITROLINGUAL) 400 mcg/spray spray USE 1 SPRAY BY SUBLINGUAL ROUTE EVERY FIVE (5) MINUTES AS NEEDED FOR CHEST PAIN. 11/8/22  Yes Danielle Dumont NP   doxycycline (MONODOX) 100 mg capsule TAKE 1 CAPSULE BY MOUTH 2 TIMES A DAY 5/17/22  Yes Provider, Historical   PARoxetine (PAXIL) 20 mg tablet Take 20 mg by mouth in the morning. 7/8/22  Yes Provider, Historical   predniSONE (DELTASONE) 20 mg tablet TAKE 1 TABLET BY MOUTH 2 TIMES A DAY 7/12/22  Yes Provider, Historical   metoprolol tartrate (LOPRESSOR) 25 mg tablet Take 1 Tablet by mouth two (2) times a day.  7/26/22  Yes Danielle Dumont NP   atorvastatin (LIPITOR) 20 mg tablet Take 1 Tablet by mouth in the morning. 7/26/22  Yes Danielle Dumont NP   amLODIPine (NORVASC) 5 mg tablet TAKE 1 TABLET BY MOUTH ONCE DAILY 3/12/21  Yes Amina Tafoya NP   ibuprofen (MOTRIN) 800 mg tablet Take 800 mg by mouth every eight (8) hours as needed for Pain. Yes Provider, Historical   cyclobenzaprine (FLEXERIL) 10 mg tablet Take 10 mg by mouth two (2) times a day. Yes Provider, Historical   ipratropium-albuteroL (COMBIVENT RESPIMAT)  mcg/actuation inhaler Take 1 Puff by inhalation every six (6) hours. Yes Provider, Historical   aspirin 81 mg tablet Take 81 mg by mouth daily. Yes Provider, Historical   metoprolol succinate (TOPROL-XL) 25 mg XL tablet Take 25 mg by mouth daily. 1/2/23   Provider, Historical   omeprazole (PRILOSEC) 20 mg capsule Take 20 mg by mouth in the morning. Patient not taking: Reported on 1/10/2023 7/8/22   Provider, Historical   Therapeutic-M 9 mg iron-400 mcg tab tablet  7/8/22   Provider, Historical   tiotropium-olodateroL (Stiolto Respimat) 2.5-2.5 mcg/actuation inhaler Take  by inhalation. Patient not taking: Reported on 1/10/2023    Provider, Historical     No Known Allergies   Social History     Tobacco Use    Smoking status: Every Day     Packs/day: 0.75     Years: 30.00     Pack years: 22.50     Types: Cigarettes    Smokeless tobacco: Never   Substance Use Topics    Alcohol use: Not Currently      Family History   Problem Relation Age of Onset    Heart Attack Father     Heart Disease Mother         Ischemic  heart disease       No current facility-administered medications for this visit. Review of Systems:  A comprehensive review of systems was negative except for that written in the HPI.     Objective:   Vital Signs:    Visit Vitals  BP (!) 167/90   Pulse 99   Temp 98.3 °F (36.8 °C) (Temporal)   Resp 20   Ht 5' 9\" (1.753 m)   Wt 47.6 kg (105 lb)   SpO2 95%   BMI 15.51 kg/m²        6-minute walk completed in the clinic today-patient walked a distance of 442 m and dyspnea scale change from 2-4 with mild increase in heart rate from 93 to 114. Oxygen saturation remained between 95 to 97%         Physical Exam   Constitutional: He is oriented to person, place, and time. He appears well-developed and well-nourished. HENT:   Head: Normocephalic and atraumatic. Eyes: Conjunctivae are normal.   Neck: Neck supple. No JVD present. No tracheal deviation present. No thyromegaly present. Cardiovascular: Normal rate and regular rhythm. Exam reveals no gallop and no friction rub. Murmur heard. Holosystolic murmur is present with a grade of 3/6 at the lower right sternal border and apex. Pulmonary/Chest: Breath sounds distant bilaterally no respiratory distress. He has no wheezes. He has no rales. He exhibits no tenderness. Abdominal: Soft. There is no abdominal tenderness. Musculoskeletal:         General: No edema. Neurological: He is alert and oriented to person, place, and time. Skin: Skin is warm and dry. Psychiatric: He has a normal mood and affect. Data review:   No results found for this or any previous visit (from the past 24 hour(s)). Lab review    CBC with differential-normal  BMP-normal  Alpha-1 antitrypsin-normal MM    Imaging:  I have personally reviewed the patients radiographs and have reviewed the reports:  XR Results (most recent):  Results from Hospital Encounter encounter on 03/19/12    XR Cloteal Miles    Narrative  Ordering MD: Sascha Mackenzie MD  Signed By: Symone Kramer MD  ** FINAL **  ---------------------------------------------------------------------  Procedure Date:  03/19/2012   Accession Number:  [id number]  Order No:   [id number]  Procedure:   RDV - CHEST  1 VIEW  CPT Code:   61553  Admit Diag:   CP  Reason:   CHEST PAIN  INTERPRETATION:  HISTORY: Chest pain. Exam: Chest.  Technique: Single view upright portable chest. Compared to  09/27/2009 exam.  FINDINGS: No pneumothorax, pneumonia or pleural effusions are seen.   Marked bilateral hyperexpansion is redemonstrated. Cardiac  silhouette, mediastinal structures and bony thorax are unchanged. IMPRESSION:  1. Marked bilateral hyperexpansion without change. CT Results (most recent):  No results found for this or any previous visit. Pulmonary Function Test   12/14/22     Patient effort:   Good   Meets ATS criteria for interpretation     Flows:     FVC is reduced to 72% predicted   Maximal Mid Expiratory Flow rate is reduced to 20 % predicted   Forced Expiratory Volume in one second is reduced to 34% predicted   FEV 1% is reduced     Flow Volume Loop:   Nonspecific obstructive pattern in Flow Volume Loop     Bronchodilator:   Partial response to bronchodilators     Impression:   Severe obstructive defect     PFT:  NAME:  Andi Fret:  OP       DATE:   03/15/2006   #:                                   MR#:  [id number]   REFERRING PHYSICIAN:    DISABILITY DET. SVS.   COMMENTS:   Examination of the expiratory spirogram reveals a smooth curve and good   effort. The forced expiratory time is greater than six seconds and the   terminal plateau is reached. The test meets ATS criteria for acceptability   and reproducibility. Forced vital capacity and FEV1 are normal.  The FEV1/FVC ratio is reduced   at 67% of predicted. This is compatible with mild airflow obstruction by   GOLD criteria. After the administration of an inhaled bronchodilator, there is no   significant change in measured flow parameters. Diffusion capacity for carbon monoxide is moderately reduced at 47% of   predicted. This is corrected for hemoglobin of 15.0 and a   carboxyhemoglobin level of 1.0. The flow volume loop is normal.   IMPRESSION:      1. Mild airflow obstruction by GOLD criteria. 2. Moderate reduction of diffusion capacity implying loss of pulmonary         vascular bed and/or parenchyma.         ECHO ADULT COMPLETE 08/15/2022 8/15/2022    Interpretation Summary    Left Ventricle: Normal left ventricular systolic function with a visually estimated EF of 55 - 60%. Left ventricle size is normal. Normal wall thickness. Normal wall motion. Normal diastolic function. Mitral Valve: Moderate regurgitation. Left Atrium: Left atrium is mildly dilated. LA Vol Index A/L is 36 mL/m2. Pulmonary Arteries: Moderate pulmonary hypertension present. The estimated PASP is 58 mmHg. Aorta: Moderately dilated aortic root. Ao Root diameter is 4.2 cm. Signed by: Brie Prescott MD on 8/15/2022 12:54 PM     08/23/19   ECHO ADULT COMPLETE 08/23/2019 8/23/2019    Narrative · Left Ventricle: Normal cavity size, wall thickness and systolic function   (ejection fraction normal). Estimated left ventricular ejection fraction   is 56 - 60%. No regional wall motion abnormality noted. Moderate (grade 2)   left ventricular diastolic dysfunction. · Right Ventricle: Normal right ventricular size and function. · Mitral Valve: Mitral valve prolapse of the anterior leaflet. Moderate   mitral valve regurgitation. · Tricuspid Valve: Mild tricuspid valve regurgitation is present. · Pulmonary Artery: There is no evidence of pulmonary hypertension. · Pulmonic Valve: Mild pulmonic valve regurgitation is present. · Aorta: Mild aortic root dilatation. Signed by: Hilary Osuna MD         Complex decision making was made in the evaluation and management plans during this consultation. More than 50% of time was spent in counseling and coordination of care including review of data and discussion with other team members. Shamir Lorenzo MD  Discussed with the patient the current USPSTF guidelines released March 9, 2021 for screening for lung cancer.     For adults aged 48 to [de-identified] years who have a 20 pack-year smoking history and currently smoke or have quit within the past 15 years the grade B recommendation is to:  Screen for lung cancer with low-dose computed tomography (LDCT) every year. Stop screening once a person has not smoked for 15 years or has a health problem that limits life expectancy or the ability to have lung surgery. The patient  reports that he has been smoking cigarettes. He has a 22.50 pack-year smoking history. He has never used smokeless tobacco..  Discussed with patient the risks and benefits of screening, including over-diagnosis, false positive rate, and total radiation exposure. The patient currently exhibits no signs or symptoms suggestive of lung cancer. Discussed with patient the importance of compliance with yearly annual lung cancer screenings and willingness to undergo diagnosis and treatment if screening scan is positive. In addition, the patient was counseled regarding the importance of remaining smoke free and/or total smoking cessation.     Also reviewed the following if the patient has Medicare that as of February 10, 2022, Medicare only covers LDCT screening in patients aged 51-72 with at least a 20 pack-year smoking history who currently smoke or have quit in the last 15 years

## 2023-01-10 NOTE — LETTER
1/10/2023    Patient: Casey Hager. YOB: 1965   Date of Visit: 1/10/2023     Scout Hudson MD  [address] [address]  Via Fax: 1954 [facility],   [address]    Dear MD Clarissa Estrada NP,      Thank you for referring Mr. Devan Montenegro to [address] [place] [address] for evaluation. My notes for this consultation are attached. If you have questions, please do not hesitate to call me. I look forward to following your patient along with you.       Sincerely,    Eliud Angeles MD

## 2023-01-13 RX ORDER — UMECLIDINIUM BROMIDE AND VILANTEROL TRIFENATATE 62.5; 25 UG/1; UG/1
1 POWDER RESPIRATORY (INHALATION) DAILY
Qty: 1 EACH | Refills: 5 | Status: SHIPPED | OUTPATIENT
Start: 2023-01-13

## 2023-01-13 NOTE — TELEPHONE ENCOUNTER
Patient stated that [facility] never received Rx for.  Please resubmit    umeclidinium-vilanteroL (Anoro Ellipta) 62.5-25 mcg/actuation inhaler